# Patient Record
Sex: MALE | Race: WHITE | HISPANIC OR LATINO | Employment: FULL TIME | URBAN - NONMETROPOLITAN AREA
[De-identification: names, ages, dates, MRNs, and addresses within clinical notes are randomized per-mention and may not be internally consistent; named-entity substitution may affect disease eponyms.]

---

## 2023-06-24 RX ORDER — TADALAFIL 20 MG/1
TABLET ORAL
Qty: 27 TABLET | Refills: 4 | OUTPATIENT
Start: 2023-06-24

## 2023-07-07 ENCOUNTER — OFFICE VISIT (OUTPATIENT)
Dept: PRIMARY CARE | Facility: CLINIC | Age: 58
End: 2023-07-07
Payer: COMMERCIAL

## 2023-07-07 VITALS
OXYGEN SATURATION: 96 % | WEIGHT: 163.4 LBS | BODY MASS INDEX: 25.65 KG/M2 | HEIGHT: 67 IN | HEART RATE: 55 BPM | TEMPERATURE: 97.4 F | DIASTOLIC BLOOD PRESSURE: 69 MMHG | RESPIRATION RATE: 14 BRPM | SYSTOLIC BLOOD PRESSURE: 106 MMHG

## 2023-07-07 DIAGNOSIS — I83.93 VARICOSE VEINS OF BOTH LOWER EXTREMITIES, UNSPECIFIED WHETHER COMPLICATED: ICD-10-CM

## 2023-07-07 DIAGNOSIS — N52.9 MALE ERECTILE DISORDER: ICD-10-CM

## 2023-07-07 DIAGNOSIS — D68.51 FACTOR V LEIDEN (MULTI): ICD-10-CM

## 2023-07-07 DIAGNOSIS — T78.40XD ALLERGIC REACTION, SUBSEQUENT ENCOUNTER: ICD-10-CM

## 2023-07-07 DIAGNOSIS — M19.079 ANKLE ARTHRITIS: ICD-10-CM

## 2023-07-07 DIAGNOSIS — E78.5 HYPERLIPIDEMIA, UNSPECIFIED HYPERLIPIDEMIA TYPE: Primary | ICD-10-CM

## 2023-07-07 PROBLEM — Z23 IMMUNIZATION DUE: Status: ACTIVE | Noted: 2023-07-07

## 2023-07-07 PROCEDURE — 1036F TOBACCO NON-USER: CPT | Performed by: FAMILY MEDICINE

## 2023-07-07 PROCEDURE — 99214 OFFICE O/P EST MOD 30 MIN: CPT | Performed by: FAMILY MEDICINE

## 2023-07-07 RX ORDER — SILDENAFIL 50 MG/1
50 TABLET, FILM COATED ORAL DAILY PRN
Qty: 30 TABLET | Refills: 3 | Status: SHIPPED | OUTPATIENT
Start: 2023-07-07 | End: 2023-08-16 | Stop reason: SDUPTHER

## 2023-07-07 RX ORDER — EPINEPHRINE 0.3 MG/.3ML
INJECTION SUBCUTANEOUS
Qty: 2 EACH | Refills: 3 | Status: SHIPPED | OUTPATIENT
Start: 2023-07-07

## 2023-07-07 RX ORDER — TADALAFIL 20 MG/1
20 TABLET ORAL DAILY PRN
Qty: 10 TABLET | Refills: 0 | Status: SHIPPED | OUTPATIENT
Start: 2023-07-07 | End: 2023-08-16 | Stop reason: SDUPTHER

## 2023-07-07 RX ORDER — TADALAFIL 20 MG/1
TABLET ORAL
COMMUNITY
Start: 2016-04-21 | End: 2023-07-07 | Stop reason: SDUPTHER

## 2023-07-07 RX ORDER — EPINEPHRINE 0.3 MG/.3ML
INJECTION SUBCUTANEOUS
COMMUNITY
Start: 2016-08-22 | End: 2023-07-07 | Stop reason: SDUPTHER

## 2023-07-07 NOTE — PROGRESS NOTES
"Subjective   Patient ID: Barrett Ha is a 57 y.o. male who presents for Follow-up (Pt is here for follow up meds/refills- pt has no concerns at this time ).    HPI     High hol is genetic     Non smoker     Exercises fairly regularly     Sober now, hasn't had a drink in years     Feels like he is healthy overall.    Did have a provoked DVT historically, after being extremely ill with mono.    He was temporarily on Coumadin, and is no longer on any anticoagulation.    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  He still maintains activity.    He is requesting medication to assist with erectile dysfunction.  He likes Cialis for longer effects, but would like to have some sildenafil because it leaves his system more quickly and does not cause as many side effects.        Review of Systems    ROS     Objective   /69 (BP Location: Left arm, Patient Position: Sitting, BP Cuff Size: Small adult)   Pulse 55   Temp 36.3 °C (97.4 °F) (Temporal)   Resp 14   Ht 1.702 m (5' 7\")   Wt 74.1 kg (163 lb 6.4 oz)   SpO2 96%   BMI 25.59 kg/m²     Physical Exam  Constitutional:       General: He is not in acute distress.     Appearance: Normal appearance.   Cardiovascular:      Rate and Rhythm: Normal rate and regular rhythm.      Heart sounds: Normal heart sounds.   Pulmonary:      Effort: Pulmonary effort is normal.      Breath sounds: Normal breath sounds. No wheezing, rhonchi or rales.   Abdominal:      Palpations: Abdomen is soft.      Tenderness: There is no abdominal tenderness. There is no guarding or rebound.   Musculoskeletal:      Right lower leg: No edema.      Left lower leg: No edema.      Comments: Varicose veins bilaterally    Neurological:      Mental Status: He is alert.   Psychiatric:         Mood and Affect: Mood normal.             Assessment/Plan   Diagnoses and all orders for this visit:  Hyperlipidemia, " unspecified hyperlipidemia type  Varicose veins of both lower extremities, unspecified whether complicated  Factor V Leiden (CMS/HCC)  Male erectile disorder  -     tadalafil 20 mg tablet; Take 1 tablet (20 mg) by mouth once daily as needed for erectile dysfunction.  -     sildenafil (Viagra) 50 mg tablet; Take 1 tablet (50 mg) by mouth once daily as needed for erectile dysfunction.  Ankle arthritis  Allergic reaction, subsequent encounter  -     EPINEPHrine 0.3 mg/0.3 mL injection syringe; Inject as needed for severe allergy symptoms     Patient is advised to take vitamin D3 over-the-counter between 1005 1000 international units daily.    Patient is advised to go to a pharmacy and receive a shingles vaccine series.    Labs deferred, chronic mild elevation in cholesterol, would use lifestyle to address.  Keep up your good work with nutrition and exercise.     Follow-up in 1 year with Dr. Pryor for complete physical exam.

## 2023-08-16 DIAGNOSIS — N52.9 MALE ERECTILE DISORDER: ICD-10-CM

## 2023-08-16 NOTE — Clinical Note
Refills for Viagra and Cialis received, which 1 does he prefer, generally use 1 or the other, depending on which one works better.

## 2023-08-17 RX ORDER — SILDENAFIL 50 MG/1
50 TABLET, FILM COATED ORAL DAILY PRN
Qty: 90 TABLET | Refills: 3 | Status: SHIPPED | OUTPATIENT
Start: 2023-08-17 | End: 2023-08-30 | Stop reason: SDUPTHER

## 2023-08-17 RX ORDER — TADALAFIL 20 MG/1
20 TABLET ORAL DAILY PRN
Qty: 90 TABLET | Refills: 0 | Status: SHIPPED | OUTPATIENT
Start: 2023-08-17 | End: 2023-09-01 | Stop reason: SDUPTHER

## 2023-08-27 ENCOUNTER — TELEPHONE (OUTPATIENT)
Dept: PRIMARY CARE | Facility: CLINIC | Age: 58
End: 2023-08-27
Payer: COMMERCIAL

## 2023-08-27 NOTE — TELEPHONE ENCOUNTER
I think the problem with [facility] is they do not want to fill both, want to know which one Barrett prefers.  Let me know, I'll resend

## 2023-08-28 ENCOUNTER — TELEPHONE (OUTPATIENT)
Dept: PRIMARY CARE | Facility: CLINIC | Age: 58
End: 2023-08-28
Payer: COMMERCIAL

## 2023-08-28 NOTE — TELEPHONE ENCOUNTER
Mey, do you remember this?  (see attached/previous messages).  Barrett is requesting cialis and viagra, insurance is denying the scripts I sent.  I'm sure it's because they want one or the other

## 2023-08-28 NOTE — TELEPHONE ENCOUNTER
"Spoke with pt. He states [facility] started him on both medications and that's how he took it.  He states he would like the Cialis to [facility] and Viagra [facility]. I advised him with this type of medication it is one or the other. \"He states he was advised by [facility] to take both.\"  "

## 2023-08-28 NOTE — TELEPHONE ENCOUNTER
I sent message yesterday stating he likely needs to pick one to have coverage.  Either the viagra or cialis

## 2023-08-30 ENCOUNTER — TELEPHONE (OUTPATIENT)
Dept: PRIMARY CARE | Facility: CLINIC | Age: 58
End: 2023-08-30
Payer: COMMERCIAL

## 2023-08-30 DIAGNOSIS — N52.9 MALE ERECTILE DISORDER: ICD-10-CM

## 2023-08-30 RX ORDER — SILDENAFIL 100 MG/1
100 TABLET, FILM COATED ORAL DAILY PRN
Qty: 30 TABLET | Refills: 3 | Status: SHIPPED | OUTPATIENT
Start: 2023-08-30 | End: 2023-09-05 | Stop reason: ALTCHOICE

## 2023-08-30 NOTE — TELEPHONE ENCOUNTER
Sent in [facility] to [facility]     100 mg     30 tabs    (Will be either 120 doses, 60 doses, or 30 doses depending on whether he uses 25 mg,   50 mg  or 100mg   as needed

## 2023-09-01 DIAGNOSIS — N52.9 MALE ERECTILE DISORDER: ICD-10-CM

## 2023-09-01 RX ORDER — TADALAFIL 20 MG/1
20 TABLET ORAL DAILY PRN
Qty: 90 TABLET | Refills: 0 | Status: SHIPPED | OUTPATIENT
Start: 2023-09-01

## 2023-09-05 DIAGNOSIS — N52.9 MALE ERECTILE DISORDER: ICD-10-CM

## 2023-09-05 RX ORDER — SILDENAFIL 100 MG/1
100 TABLET, FILM COATED ORAL DAILY PRN
Qty: 30 TABLET | Refills: 3 | OUTPATIENT
Start: 2023-09-05

## 2024-04-03 ENCOUNTER — APPOINTMENT (OUTPATIENT)
Dept: PRIMARY CARE | Facility: CLINIC | Age: 59
End: 2024-04-03
Payer: COMMERCIAL

## 2024-06-01 ENCOUNTER — APPOINTMENT (OUTPATIENT)
Dept: PRIMARY CARE | Facility: CLINIC | Age: 59
End: 2024-06-01
Payer: COMMERCIAL

## 2024-07-01 DIAGNOSIS — N52.9 MALE ERECTILE DISORDER: ICD-10-CM

## 2024-07-01 RX ORDER — TADALAFIL 20 MG/1
20 TABLET ORAL DAILY PRN
Qty: 90 TABLET | Refills: 0 | Status: SHIPPED | OUTPATIENT
Start: 2024-07-01 | End: 2024-07-02 | Stop reason: SDUPTHER

## 2024-07-02 DIAGNOSIS — N52.9 MALE ERECTILE DISORDER: ICD-10-CM

## 2024-07-02 RX ORDER — TADALAFIL 20 MG/1
20 TABLET ORAL DAILY PRN
Qty: 90 TABLET | Refills: 0 | Status: SHIPPED | OUTPATIENT
Start: 2024-07-02

## 2024-07-29 ASSESSMENT — PROMIS GLOBAL HEALTH SCALE
RATE_MENTAL_HEALTH: VERY GOOD
EMOTIONAL_PROBLEMS: SOMETIMES
CARRYOUT_PHYSICAL_ACTIVITIES: COMPLETELY
RATE_SOCIAL_SATISFACTION: VERY GOOD
CARRYOUT_SOCIAL_ACTIVITIES: VERY GOOD
RATE_PHYSICAL_HEALTH: GOOD
RATE_AVERAGE_FATIGUE: MODERATE
RATE_QUALITY_OF_LIFE: GOOD
RATE_GENERAL_HEALTH: GOOD
RATE_AVERAGE_PAIN: 2

## 2024-08-02 ENCOUNTER — APPOINTMENT (OUTPATIENT)
Dept: PRIMARY CARE | Facility: CLINIC | Age: 59
End: 2024-08-02
Payer: COMMERCIAL

## 2024-08-02 VITALS
TEMPERATURE: 98.8 F | HEIGHT: 68 IN | DIASTOLIC BLOOD PRESSURE: 66 MMHG | OXYGEN SATURATION: 94 % | SYSTOLIC BLOOD PRESSURE: 104 MMHG | BODY MASS INDEX: 26.34 KG/M2 | WEIGHT: 173.8 LBS | HEART RATE: 74 BPM

## 2024-08-02 DIAGNOSIS — G62.89 OTHER POLYNEUROPATHY: ICD-10-CM

## 2024-08-02 DIAGNOSIS — Z23 IMMUNIZATION DUE: Primary | ICD-10-CM

## 2024-08-02 DIAGNOSIS — L60.8 ONYCHOMADESIS OF TOENAIL: ICD-10-CM

## 2024-08-02 DIAGNOSIS — Z00.00 HEALTHCARE MAINTENANCE: ICD-10-CM

## 2024-08-02 DIAGNOSIS — Z12.5 SCREENING PSA (PROSTATE SPECIFIC ANTIGEN): ICD-10-CM

## 2024-08-02 DIAGNOSIS — Z12.11 SCREENING FOR MALIGNANT NEOPLASM OF COLON: ICD-10-CM

## 2024-08-02 DIAGNOSIS — E78.5 HYPERLIPIDEMIA, UNSPECIFIED HYPERLIPIDEMIA TYPE: ICD-10-CM

## 2024-08-02 PROCEDURE — 90471 IMMUNIZATION ADMIN: CPT | Performed by: FAMILY MEDICINE

## 2024-08-02 PROCEDURE — 90750 HZV VACC RECOMBINANT IM: CPT | Performed by: FAMILY MEDICINE

## 2024-08-02 PROCEDURE — 99396 PREV VISIT EST AGE 40-64: CPT | Performed by: FAMILY MEDICINE

## 2024-08-02 PROCEDURE — 3008F BODY MASS INDEX DOCD: CPT | Performed by: FAMILY MEDICINE

## 2024-08-02 RX ORDER — TERBINAFINE HYDROCHLORIDE 250 MG/1
250 TABLET ORAL DAILY
Qty: 30 TABLET | Refills: 2 | Status: CANCELLED | OUTPATIENT
Start: 2024-08-02 | End: 2024-10-31

## 2024-08-02 NOTE — PROGRESS NOTES
"Subjective   Patient ID: Barrett Ha is a 58 y.o. male who presents for Annual Exam (Barrett is here for his Annual PE. Pt states he has a few thing to discuss today., ).    HPI   Barrett was seen today for an annual wellness review.  He takes no consistent medications, keeps an EpiPen on hand, tadalafil as needed.  He is due for fasting labs, as well as a PSA.  He has yet to have a colonoscopy  Barrett is due for Shingrix and a Tdap booster.  He is concerned about his circulation, describes paresthesias in both hands and feet.  He has no significant neck or low back pain, no headaches or other focal neurologic symptoms.  He used to drink regularly, has not been in several years.  He would like his left great toenail checked, believes he has a nail fungus.  He has a history of hyperlipidemia, last LDL was 149, HDL 59, triglycerides 91.  Review of Systems  The full, 10+ multi-organ review of systems, is within normal limits with the exception of what is noted above in HPI.  Objective   /66 (BP Location: Right arm, Patient Position: Sitting, BP Cuff Size: Adult)   Pulse 74   Temp 37.1 °C (98.8 °F) (Temporal)   Ht 1.715 m (5' 7.5\")   Wt 78.8 kg (173 lb 12.8 oz)   SpO2 94%   BMI 26.82 kg/m²     Physical Exam  Constitutional/General appearance: alert, oriented, well-appearing, in no distress  Head and face exam is normal  No scleral icterus or conjunctival erythema present  Hearing is grossly normal  Respiratory effort is normal, no dyspnea noted  Cortical function is normal  Mood, affect, are pleasant, appropriate, and interactive.  Insight is normal  Cardiac exam reveals a regular rate and rhythm, no murmurs, rubs or gallops present.   Lungs are clear bilaterally.    No lower extremity edema present.  Bilateral dorsalis pedis pulses along with bilateral posterior tibialis pulses are normal.  Radial pulses also.  Bilateral upper and lower extremity strength, light touch sensation, and deep tendon reflexes are " normal.  Abdominal and ENT exams are within normal limits.  Assessment/Plan     Today your wellness care was reviewed.  Please keep up your vision and dental care.  Focus on lifestyle efforts, including a goal of 30 minutes of exercise 5 days/week.    A healthy diet rich in fruits, vegetables, and lean proteins encouraged.  Healthy fats, such as can be found in nuts, olives, avocados are encouraged (unless allergies prohibit).  Avoid/minimize junk and fast food     Check full labs and urinalysis, and add'l for neuropathy (upper and lower extremity).  Consider nerve conduction/EMG testing.  Lamisil 250 mg daily x 3 months for onychomycosis--do not start until liver function labs are normal  Colonoscopy, referral placed  Reassurance, circulation is good in extremities  Nurse visit shingles #1, and 2-6 months later for booster  Hyperlipidemia history, labs as ordered.    Follow-up in 1 year, sooner if needed.  **Portions of this medical record have been created using voice recognition software and may have minor errors which are inherent in voice recognition systems. It has not been fully edited for typographical or grammatical errors**

## 2024-08-02 NOTE — PATIENT INSTRUCTIONS
Check full labs and urinalysis, and add'l for neuropathy (upper and lower extremity)  Lamisil 250 mg daily x 3 months for onychomycosis--do not start until liver function labs are normal  Colonoscopy, referral placed  Reassurance, circulation is good in extremities  Nurse visit shingles #1, and 2-6 months later for booster

## 2024-08-05 DIAGNOSIS — B35.1 ONYCHOMYCOSIS OF GREAT TOE: Primary | ICD-10-CM

## 2024-08-05 RX ORDER — TERBINAFINE HYDROCHLORIDE 250 MG/1
250 TABLET ORAL DAILY
Qty: 30 TABLET | Refills: 2 | Status: SHIPPED | OUTPATIENT
Start: 2024-08-05

## 2024-08-14 DIAGNOSIS — N52.9 MALE ERECTILE DISORDER: ICD-10-CM

## 2024-08-14 RX ORDER — TADALAFIL 20 MG/1
20-30 TABLET ORAL DAILY PRN
Qty: 90 TABLET | Refills: 0 | Status: SHIPPED | OUTPATIENT
Start: 2024-08-14 | End: 2024-08-19 | Stop reason: SDUPTHER

## 2024-08-19 DIAGNOSIS — N52.9 MALE ERECTILE DISORDER: ICD-10-CM

## 2024-08-19 RX ORDER — TADALAFIL 20 MG/1
20-30 TABLET ORAL DAILY PRN
Qty: 90 TABLET | Refills: 0 | Status: SHIPPED | OUTPATIENT
Start: 2024-08-19

## 2024-08-29 ENCOUNTER — LAB (OUTPATIENT)
Dept: LAB | Facility: LAB | Age: 59
End: 2024-08-29
Payer: COMMERCIAL

## 2024-08-29 ENCOUNTER — APPOINTMENT (OUTPATIENT)
Dept: PRIMARY CARE | Facility: CLINIC | Age: 59
End: 2024-08-29
Payer: COMMERCIAL

## 2024-08-29 ENCOUNTER — TELEPHONE (OUTPATIENT)
Dept: PRIMARY CARE | Facility: CLINIC | Age: 59
End: 2024-08-29

## 2024-08-29 DIAGNOSIS — Z00.00 HEALTHCARE MAINTENANCE: ICD-10-CM

## 2024-08-29 DIAGNOSIS — G62.89 OTHER POLYNEUROPATHY: ICD-10-CM

## 2024-08-29 DIAGNOSIS — Z23 IMMUNIZATION DUE: Primary | ICD-10-CM

## 2024-08-29 DIAGNOSIS — Z12.5 SCREENING PSA (PROSTATE SPECIFIC ANTIGEN): ICD-10-CM

## 2024-08-29 DIAGNOSIS — E78.5 HYPERLIPIDEMIA, UNSPECIFIED HYPERLIPIDEMIA TYPE: ICD-10-CM

## 2024-08-29 DIAGNOSIS — R79.0 ABNORMAL IRON SATURATION: ICD-10-CM

## 2024-08-29 DIAGNOSIS — Z23 IMMUNIZATION DUE: ICD-10-CM

## 2024-08-29 DIAGNOSIS — R79.0 ABNORMAL BLOOD LEVEL OF IRON: ICD-10-CM

## 2024-08-29 LAB
ALBUMIN SERPL BCP-MCNC: 4.4 G/DL (ref 3.4–5)
ALP SERPL-CCNC: 44 U/L (ref 33–120)
ALT SERPL W P-5'-P-CCNC: 22 U/L (ref 10–52)
ANION GAP SERPL CALC-SCNC: 13 MMOL/L (ref 10–20)
APPEARANCE UR: CLEAR
AST SERPL W P-5'-P-CCNC: 18 U/L (ref 9–39)
BASOPHILS # BLD AUTO: 0.03 X10*3/UL (ref 0–0.1)
BASOPHILS NFR BLD AUTO: 0.5 %
BILIRUB SERPL-MCNC: 0.8 MG/DL (ref 0–1.2)
BILIRUB UR STRIP.AUTO-MCNC: NEGATIVE MG/DL
BUN SERPL-MCNC: 11 MG/DL (ref 6–23)
CALCIUM SERPL-MCNC: 9.3 MG/DL (ref 8.6–10.6)
CHLORIDE SERPL-SCNC: 106 MMOL/L (ref 98–107)
CHOLEST SERPL-MCNC: 203 MG/DL (ref 0–199)
CHOLESTEROL/HDL RATIO: 3.7
CO2 SERPL-SCNC: 26 MMOL/L (ref 21–32)
COLOR UR: COLORLESS
CREAT SERPL-MCNC: 0.99 MG/DL (ref 0.5–1.3)
EGFRCR SERPLBLD CKD-EPI 2021: 89 ML/MIN/1.73M*2
EOSINOPHIL # BLD AUTO: 0.16 X10*3/UL (ref 0–0.7)
EOSINOPHIL NFR BLD AUTO: 2.7 %
ERYTHROCYTE [DISTWIDTH] IN BLOOD BY AUTOMATED COUNT: 12.4 % (ref 11.5–14.5)
EST. AVERAGE GLUCOSE BLD GHB EST-MCNC: 103 MG/DL
FOLATE SERPL-MCNC: 11 NG/ML
GLUCOSE SERPL-MCNC: 91 MG/DL (ref 74–99)
GLUCOSE UR STRIP.AUTO-MCNC: NORMAL MG/DL
HBA1C MFR BLD: 5.2 %
HCT VFR BLD AUTO: 46.7 % (ref 41–52)
HDLC SERPL-MCNC: 54.5 MG/DL
HGB BLD-MCNC: 15 G/DL (ref 13.5–17.5)
IMM GRANULOCYTES # BLD AUTO: 0.02 X10*3/UL (ref 0–0.7)
IMM GRANULOCYTES NFR BLD AUTO: 0.3 % (ref 0–0.9)
IRON SATN MFR SERPL: 57 % (ref 25–45)
IRON SERPL-MCNC: 165 UG/DL (ref 35–150)
KETONES UR STRIP.AUTO-MCNC: NEGATIVE MG/DL
LDLC SERPL CALC-MCNC: 124 MG/DL
LEUKOCYTE ESTERASE UR QL STRIP.AUTO: NEGATIVE
LYMPHOCYTES # BLD AUTO: 1.7 X10*3/UL (ref 1.2–4.8)
LYMPHOCYTES NFR BLD AUTO: 29 %
MCH RBC QN AUTO: 29.6 PG (ref 26–34)
MCHC RBC AUTO-ENTMCNC: 32.1 G/DL (ref 32–36)
MCV RBC AUTO: 92 FL (ref 80–100)
MONOCYTES # BLD AUTO: 0.31 X10*3/UL (ref 0.1–1)
MONOCYTES NFR BLD AUTO: 5.3 %
NEUTROPHILS # BLD AUTO: 3.64 X10*3/UL (ref 1.2–7.7)
NEUTROPHILS NFR BLD AUTO: 62.2 %
NITRITE UR QL STRIP.AUTO: NEGATIVE
NON HDL CHOLESTEROL: 149 MG/DL (ref 0–149)
NRBC BLD-RTO: 0 /100 WBCS (ref 0–0)
PH UR STRIP.AUTO: 6.5 [PH]
PLATELET # BLD AUTO: 184 X10*3/UL (ref 150–450)
POTASSIUM SERPL-SCNC: 3.9 MMOL/L (ref 3.5–5.3)
PROT SERPL-MCNC: 6.9 G/DL (ref 6.4–8.2)
PROT SERPL-MCNC: 6.9 G/DL (ref 6.4–8.2)
PROT UR STRIP.AUTO-MCNC: NEGATIVE MG/DL
PROT UR-ACNC: <4 MG/DL (ref 5–25)
PSA SERPL-MCNC: 0.56 NG/ML
RBC # BLD AUTO: 5.06 X10*6/UL (ref 4.5–5.9)
RBC # UR STRIP.AUTO: NEGATIVE /UL
SODIUM SERPL-SCNC: 141 MMOL/L (ref 136–145)
SP GR UR STRIP.AUTO: 1
TIBC SERPL-MCNC: 291 UG/DL (ref 240–445)
TRIGL SERPL-MCNC: 121 MG/DL (ref 0–149)
TSH SERPL-ACNC: 0.86 MIU/L (ref 0.44–3.98)
UIBC SERPL-MCNC: 126 UG/DL (ref 110–370)
UROBILINOGEN UR STRIP.AUTO-MCNC: NORMAL MG/DL
VIT B12 SERPL-MCNC: 374 PG/ML (ref 211–911)
VLDL: 24 MG/DL (ref 0–40)
WBC # BLD AUTO: 5.9 X10*3/UL (ref 4.4–11.3)

## 2024-08-29 PROCEDURE — 84153 ASSAY OF PSA TOTAL: CPT

## 2024-08-29 PROCEDURE — 82728 ASSAY OF FERRITIN: CPT

## 2024-08-29 PROCEDURE — 80053 COMPREHEN METABOLIC PANEL: CPT

## 2024-08-29 PROCEDURE — 85025 COMPLETE CBC W/AUTO DIFF WBC: CPT

## 2024-08-29 PROCEDURE — 82746 ASSAY OF FOLIC ACID SERUM: CPT

## 2024-08-29 PROCEDURE — 83036 HEMOGLOBIN GLYCOSYLATED A1C: CPT

## 2024-08-29 PROCEDURE — 84166 PROTEIN E-PHORESIS/URINE/CSF: CPT

## 2024-08-29 PROCEDURE — 84155 ASSAY OF PROTEIN SERUM: CPT

## 2024-08-29 PROCEDURE — 81256 HFE GENE: CPT

## 2024-08-29 PROCEDURE — 84165 PROTEIN E-PHORESIS SERUM: CPT

## 2024-08-29 PROCEDURE — 80061 LIPID PANEL: CPT

## 2024-08-29 PROCEDURE — 81003 URINALYSIS AUTO W/O SCOPE: CPT

## 2024-08-29 PROCEDURE — G0452 MOLECULAR PATHOLOGY INTERPR: HCPCS | Performed by: FAMILY MEDICINE

## 2024-08-29 PROCEDURE — 90750 HZV VACC RECOMBINANT IM: CPT | Performed by: FAMILY MEDICINE

## 2024-08-29 PROCEDURE — 83540 ASSAY OF IRON: CPT

## 2024-08-29 PROCEDURE — 90471 IMMUNIZATION ADMIN: CPT | Performed by: FAMILY MEDICINE

## 2024-08-29 PROCEDURE — 36415 COLL VENOUS BLD VENIPUNCTURE: CPT

## 2024-08-29 PROCEDURE — 84156 ASSAY OF PROTEIN URINE: CPT

## 2024-08-29 PROCEDURE — 84443 ASSAY THYROID STIM HORMONE: CPT

## 2024-08-29 PROCEDURE — 82607 VITAMIN B-12: CPT

## 2024-08-29 PROCEDURE — 83550 IRON BINDING TEST: CPT

## 2024-08-31 LAB
ALBUMIN MFR UR ELPH: 35.1 %
ALBUMIN: 4.5 G/DL (ref 3.4–5)
ALPHA 1 GLOBULIN: 0.3 G/DL (ref 0.2–0.6)
ALPHA 2 GLOBULIN: 0.5 G/DL (ref 0.4–1.1)
ALPHA1 GLOB MFR UR ELPH: 13.6 %
ALPHA2 GLOB MFR UR ELPH: 16.9 %
B-GLOBULIN MFR UR ELPH: 14.5 %
BETA GLOBULIN: 0.8 G/DL (ref 0.5–1.2)
GAMMA GLOB MFR UR ELPH: 19.9 %
GAMMA GLOBULIN: 0.9 G/DL (ref 0.5–1.4)
PATH REVIEW-SERUM PROTEIN ELECTROPHORESIS: NORMAL
PATH REVIEW-URINE PROTEIN ELECTROPHORESIS: NORMAL
PROTEIN ELECTROPHORESIS COMMENT: NORMAL
URINE ELECTROPHORESIS COMMENT: NORMAL

## 2024-09-04 LAB — FERRITIN SERPL-MCNC: 182 NG/ML (ref 20–300)

## 2024-09-09 ENCOUNTER — TELEPHONE (OUTPATIENT)
Dept: PRIMARY CARE | Facility: CLINIC | Age: 59
End: 2024-09-09
Payer: COMMERCIAL

## 2024-09-09 DIAGNOSIS — R79.0 ABNORMAL IRON SATURATION: ICD-10-CM

## 2024-09-09 DIAGNOSIS — Z83.49 FAMILY HISTORY OF HEMOCHROMATOSIS: Primary | ICD-10-CM

## 2024-09-09 LAB
ELECTRONICALLY SIGNED BY: NORMAL
HFE GENE MUT TESTED BLD/T: NORMAL
HFE P.C282Y BLD/T QL: NORMAL
HFE P.H63D BLD/T QL: NORMAL

## 2024-09-09 NOTE — TELEPHONE ENCOUNTER
Please let Barrett know that the hemochromatosis gene test could not be added to the previous lab work after all, I placed an order, so he can come to the lab when able, does not have to fast.

## 2024-09-17 ENCOUNTER — TELEPHONE (OUTPATIENT)
Dept: PRIMARY CARE | Facility: CLINIC | Age: 59
End: 2024-09-17
Payer: COMMERCIAL

## 2024-09-17 DIAGNOSIS — N52.9 MALE ERECTILE DISORDER: ICD-10-CM

## 2024-09-17 RX ORDER — TADALAFIL 20 MG/1
TABLET ORAL
Qty: 45 TABLET | Refills: 3 | Status: SHIPPED | OUTPATIENT
Start: 2024-09-17

## 2024-09-17 NOTE — TELEPHONE ENCOUNTER
I sent a prescription for Cialis to your [facility], they will run it through [facility], and not use your insurance.  This is definitely the least expensive way for you to get it.  I sent a prescription for 45 tablets plus refills.

## 2024-10-26 DIAGNOSIS — B35.1 ONYCHOMYCOSIS OF GREAT TOE: ICD-10-CM

## 2024-10-26 RX ORDER — TERBINAFINE HYDROCHLORIDE 250 MG/1
250 TABLET ORAL DAILY
Qty: 30 TABLET | Refills: 2 | Status: SHIPPED | OUTPATIENT
Start: 2024-10-26

## 2024-11-05 ENCOUNTER — APPOINTMENT (OUTPATIENT)
Dept: PRIMARY CARE | Facility: CLINIC | Age: 59
End: 2024-11-05
Payer: COMMERCIAL

## 2024-11-05 DIAGNOSIS — Z23 IMMUNIZATION DUE: ICD-10-CM

## 2024-11-05 PROCEDURE — 90750 HZV VACC RECOMBINANT IM: CPT | Performed by: FAMILY MEDICINE

## 2024-11-05 PROCEDURE — 90471 IMMUNIZATION ADMIN: CPT | Performed by: FAMILY MEDICINE

## 2024-11-11 DIAGNOSIS — B35.1 ONYCHOMYCOSIS OF GREAT TOE: ICD-10-CM

## 2024-11-11 RX ORDER — TERBINAFINE HYDROCHLORIDE 250 MG/1
250 TABLET ORAL DAILY
Qty: 30 TABLET | Refills: 2 | Status: SHIPPED | OUTPATIENT
Start: 2024-11-11

## 2024-11-26 ENCOUNTER — APPOINTMENT (OUTPATIENT)
Dept: HEMATOLOGY/ONCOLOGY | Facility: CLINIC | Age: 59
End: 2024-11-26
Payer: COMMERCIAL

## 2024-12-20 ENCOUNTER — OFFICE VISIT (OUTPATIENT)
Dept: HEMATOLOGY/ONCOLOGY | Facility: CLINIC | Age: 59
End: 2024-12-20
Payer: COMMERCIAL

## 2024-12-20 ENCOUNTER — LAB (OUTPATIENT)
Dept: LAB | Facility: CLINIC | Age: 59
End: 2024-12-20
Payer: COMMERCIAL

## 2024-12-20 ENCOUNTER — APPOINTMENT (OUTPATIENT)
Dept: HEMATOLOGY/ONCOLOGY | Facility: CLINIC | Age: 59
End: 2024-12-20
Payer: COMMERCIAL

## 2024-12-20 VITALS
RESPIRATION RATE: 16 BRPM | SYSTOLIC BLOOD PRESSURE: 109 MMHG | WEIGHT: 173.17 LBS | DIASTOLIC BLOOD PRESSURE: 73 MMHG | OXYGEN SATURATION: 95 % | TEMPERATURE: 97.3 F | HEART RATE: 67 BPM | HEIGHT: 66 IN | BODY MASS INDEX: 27.83 KG/M2

## 2024-12-20 DIAGNOSIS — Z83.49 FAMILY HISTORY OF HEMOCHROMATOSIS: ICD-10-CM

## 2024-12-20 DIAGNOSIS — R79.0 ABNORMAL IRON SATURATION: ICD-10-CM

## 2024-12-20 PROCEDURE — 3008F BODY MASS INDEX DOCD: CPT | Performed by: INTERNAL MEDICINE

## 2024-12-20 PROCEDURE — 81256 HFE GENE: CPT

## 2024-12-20 PROCEDURE — 99204 OFFICE O/P NEW MOD 45 MIN: CPT | Performed by: INTERNAL MEDICINE

## 2024-12-20 PROCEDURE — 99214 OFFICE O/P EST MOD 30 MIN: CPT | Performed by: INTERNAL MEDICINE

## 2024-12-20 PROCEDURE — 36415 COLL VENOUS BLD VENIPUNCTURE: CPT

## 2024-12-20 PROCEDURE — G2211 COMPLEX E/M VISIT ADD ON: HCPCS | Performed by: INTERNAL MEDICINE

## 2024-12-20 ASSESSMENT — PAIN SCALES - GENERAL: PAINLEVEL_OUTOF10: 0-NO PAIN

## 2024-12-20 NOTE — PROGRESS NOTES
Patient for labs today.  FUV in three months with labs prior.  Patient independently ambulatory off unit in NAD and without complaints.  Gait steady.  Call back instructions reviewed.  Patient verbalized understanding.  Patient text his sister fo see if she knows her gene mutation.  Instructed patient he can either call our office or sent to Dr. Darnell via [facility].  Patient verbalized understanding and confirms he does use [facility].

## 2024-12-20 NOTE — PROGRESS NOTES
Patient ID: Barrett Ha is a 59 y.o. male.  Referring Physician: Favian Pryor MD  [address] [facility]  [place] [address]  Primary Care Provider: Favian Pryor MD      ORDERS & PATIENT INSTRUCTIONS:    Please do hemochromatosis test  S65C (patient already had C2 82Y and H63D mutation tested)  This test is a send out test.    Patient to see if we can find out sisters gene mutation who has hemochromatosis and let me know.      Patient does not require any active treatment but he could consider donating blood once every 2 to 3 months    I expect his iron saturation to go back to normal sometime in 6-month  CBC, iron group and ferritin in 6-month (fasting)          CONSULTING PHYSICIAN:  Dr. Pryor    REASON FOR CONSULTATION:    Elevated iron    HISTORY OF PRESENT ILLNESS:    Patient is a 59-year-old white man with past medical history of factor V Leiden mutation anxiety and history of alcohol use in the past who has quit 3 years ago who has 2 half sisters with hemochromatosis who had a routine physical and was found to have elevated iron saturation of 57% with ferritin of 182.  So he had hemochromatosis profile done and he was negative for C282Y mutation as well as H63D mutation of hemochromatosis.  Although patient claims that he has several signs of possible hemochromatosis such as decreased libido and joint pains so presents here today for further evaluation.  PAST MEDICAL HISTORY:  History of factor V Leiden mutation, further details unclear, he had a varicose vein and superficial thrombophlebitis in 2008 and at that time he was worked up at [facility] by me although further details unclear and my records are not available at this time.  He was given short-term Coumadin and later was discontinued.  Patient drank 4-6 beers (IPA with higher percentages) daily from age 18 through 56 and quit 3 years ago.  Past Medical History:   Diagnosis Date    Contact with and (suspected)  "exposure to other bacterial communicable diseases 04/16/2014    Strep throat exposure    Encounter for screening for malignant neoplasm of prostate 11/02/2020    Screening PSA (prostate specific antigen)    Other conditions influencing health status 01/22/2015    Paronychia    Other specified health status     No pertinent past surgical history    Personal history of other diseases of the musculoskeletal system and connective tissue 04/16/2014    History of muscle pain    Personal history of other diseases of the respiratory system 02/14/2017    History of sinusitis    Personal history of other diseases of the respiratory system 03/22/2018    History of acute sinusitis        Past Surgical History:   Procedure Laterality Date    MOUTH SURGERY  11/06/2014    Oral Surgery Tooth Extraction                  REVIEW OF SYSTEMS:    Decreased libido, morning stiffness occasional joint swelling, ringing in the ear rest of the 10 review of system is negative and as per HPI    PHYSICAL EXAM:  Objective   BSA: 1.92 meters squared,   /73   Pulse 67   Temp 36.3 °C (97.3 °F)   Resp 16   Ht (S) 1.686 m (5' 6.38\")   Wt 78.5 kg (173 lb 2.7 oz)   SpO2 95%   BMI 27.63 kg/m²   Gen:  Conscious,  no acute distress,   HEENT: Normocephalic, no icterus. . No nasal discharge,  Oral mucosa moist  Chest: Bilateral symmetrical, Bilateral AE        CVS: S1S2.   Abdomen: Soft, no guarding or rigidity BS+   Extremities: No C/C   Skin: No petechiae        CURRENT MEDICATION:  Current Outpatient Medications   Medication Sig Dispense Refill    EPINEPHrine 0.3 mg/0.3 mL injection syringe Inject as needed for severe allergy symptoms 2 each 3    tadalafil 20 mg tablet Take 1 to 1.5 tablets as directed.  Requests good Rx discount please 45 tablet 3    terbinafine (LamISIL) 250 mg tablet Take 1 tablet (250 mg) by mouth once daily. 30 tablet 2     No current facility-administered medications for this visit.           LABS:    CBC:  Recent Labs " "    08/29/24  0847 09/09/22  0805 07/01/20  1458   WBC 5.9 4.9 6.0   HGB 15.0 15.4 15.2   HCT 46.7 45.6 44.1    185 222   MCV 92 90 90       CMP:  Recent Labs     08/29/24  0847 09/09/22  0805 07/01/20  1458    140 138   K 3.9 4.4 3.9    104 103   CO2 26 28 27   ANIONGAP 13 12 12   BUN 11 12 20   CREATININE 0.99 0.88 0.96   EGFR 89  --   --      Recent Labs     08/29/24  0847 09/09/22  0805   ALBUMIN 4.4 4.7   ALKPHOS 44 68   ALT 22 17   AST 18 15   BILITOT 0.8 0.9       HEME/ENDO:  Recent Labs     08/29/24  0847 09/09/22  0805   FERRITIN 182  --    IRONSAT 57*  --    TSH 0.86 0.95   [id number] 374  --    FOLATE 11.0  --         MICRO: No results for input(s): \"ESR\", \"CRP\", \"PROCAL\" in the last 48883 hours.  No results found for the last 90 days.        TUMOR MARKERS:  PSA (ng/mL)   Date Value   11/03/2020 0.36                IMAGING:         OUTSIDE IMAGING SCAN  Ordered by an unspecified provider.               SOCIAL HISTORY:    reports no history of alcohol use.   reports no history of drug use.,   Tobacco Use: Low Risk  (8/2/2024)    Patient History     Smoking Tobacco Use: Never     Smokeless Tobacco Use: Never     Passive Exposure: Not on file   Patient denies smoking, he drank 4-6 IPA beers with higher percentages daily from [age] through 56, quit 3 years ago.    FAMILY HISTORY:  Family History   Family history unknown: Yes   2 half sisters who has same mother but different father has hemochromatosis, further details unclear at this time..  ALLERGY:   Patient has no known allergies.          ASSESSMENT & PLAN:    Patient is a 59-year-old white man with mildly elevated iron saturation and minimally/borderline ferritin whose hemochromatosis profile was negative for C282Y mutation as well as H63D mutation of hemochromatosis.  At this time I explained to him that most likely reason for his mildly elevated iron saturation could very well be related to liver dysfunction may be related to past " alcohol use rather than hemochromatosis although possibility of hemochromatosis related to  S65C genotype mutation cannot be ruled out.  At the same time I do not believe any of his sign or symptoms could be explained by mildly elevated iron saturation and borderline ferritin but he could consider giving blood donation through blood bank especially now blood bank is excepting blood donations from patient with elevated iron.  I have also advised him to check with his sister to see which hemochromatosis gene they are caring.  Patient will now return for follow-up in 6-month in the meantime he will have 2-3 blood donations and we will recheck his iron saturation and ferritin.  At the same time I have advised him to follow-up with primary care physician for other medical needs  Medication reviewed in e-chart  labs reviewed and interpreted independently, X rays independently reviewed  Notes from other physicians involved in care were reviewed      Thank you very much for allowing me to participate in care of this patient, should you have any further question please do not hesitate to contact me.    Charting was completed using voice recognition technology and may include unintended errors.    AL DUVALL MD, JOSE.    Amber Blanca Master Clinician in Hematology and Oncology  Medical Director, [facility] at [facility].  [place] office  Phone [phone]  Fax      [phone]    [facility] /[place].  Phone [phone]  Fax     [phone]

## 2025-01-09 ENCOUNTER — TELEPHONE (OUTPATIENT)
Dept: HEMATOLOGY/ONCOLOGY | Facility: CLINIC | Age: 60
End: 2025-01-09
Payer: COMMERCIAL

## 2025-01-09 NOTE — TELEPHONE ENCOUNTER
Call placed to  Lab [phone] Pauline.  Per Pauline the only way that S65 can be ordered is through a send out panel done at [facility] test code [id number] - EDTA 1 ml room temperature.  It is ordered as a miscellaneous genetics test.

## 2025-01-09 NOTE — TELEPHONE ENCOUNTER
Patient states he was supposed to have hemochromatosis test  S65C (patient already had C2 82Y and H63D mutation tested).     All he is seeing in [facility] is is the results of the two he had and not the S65C.    Please advise.  You can call or message him through [facility].

## 2025-01-09 NOTE — TELEPHONE ENCOUNTER
Call returned to patient.  Patient notified of situation with lab and how it will need re-ordered and re-drawn.  Will discuss with provider and follow-up with patient.

## 2025-02-07 NOTE — TELEPHONE ENCOUNTER
Call returned to patient.  No answer.  Message left on identifiable voicemail with call back instructions including reminder that S65C can only be ordered as part of a send out panel which includes all 3 mutations.  Recommended patient call back and let us know if he is interested in having this panel drawn.  Call back instructions left.

## 2025-02-10 ENCOUNTER — TELEPHONE (OUTPATIENT)
Facility: CLINIC | Age: 60
End: 2025-02-10
Payer: COMMERCIAL

## 2025-03-24 ENCOUNTER — APPOINTMENT (OUTPATIENT)
Dept: PRIMARY CARE | Facility: CLINIC | Age: 60
End: 2025-03-24
Payer: COMMERCIAL

## 2025-03-24 VITALS
TEMPERATURE: 97.9 F | WEIGHT: 176.3 LBS | BODY MASS INDEX: 27.67 KG/M2 | OXYGEN SATURATION: 95 % | DIASTOLIC BLOOD PRESSURE: 61 MMHG | HEART RATE: 61 BPM | HEIGHT: 67 IN | SYSTOLIC BLOOD PRESSURE: 105 MMHG | RESPIRATION RATE: 18 BRPM

## 2025-03-24 DIAGNOSIS — T78.40XD ALLERGIC REACTION, SUBSEQUENT ENCOUNTER: ICD-10-CM

## 2025-03-24 DIAGNOSIS — D68.51 FACTOR V LEIDEN (MULTI): Primary | ICD-10-CM

## 2025-03-24 DIAGNOSIS — E78.5 HYPERLIPIDEMIA, UNSPECIFIED HYPERLIPIDEMIA TYPE: ICD-10-CM

## 2025-03-24 DIAGNOSIS — B35.1 ONYCHOMYCOSIS: ICD-10-CM

## 2025-03-24 DIAGNOSIS — I83.893 VARICOSE VEINS OF BILATERAL LOWER EXTREMITIES WITH OTHER COMPLICATIONS: ICD-10-CM

## 2025-03-24 DIAGNOSIS — Z82.49 FAMILY HISTORY OF CARDIAC ARREST: ICD-10-CM

## 2025-03-24 DIAGNOSIS — Z82.3 FAMILY HISTORY OF CVA: ICD-10-CM

## 2025-03-24 DIAGNOSIS — N52.9 MALE ERECTILE DISORDER: ICD-10-CM

## 2025-03-24 PROCEDURE — 1036F TOBACCO NON-USER: CPT | Performed by: STUDENT IN AN ORGANIZED HEALTH CARE EDUCATION/TRAINING PROGRAM

## 2025-03-24 PROCEDURE — 99214 OFFICE O/P EST MOD 30 MIN: CPT | Performed by: STUDENT IN AN ORGANIZED HEALTH CARE EDUCATION/TRAINING PROGRAM

## 2025-03-24 PROCEDURE — 3008F BODY MASS INDEX DOCD: CPT | Performed by: STUDENT IN AN ORGANIZED HEALTH CARE EDUCATION/TRAINING PROGRAM

## 2025-03-24 RX ORDER — EPINEPHRINE 0.3 MG/.3ML
INJECTION SUBCUTANEOUS
Qty: 2 EACH | Refills: 3 | Status: SHIPPED | OUTPATIENT
Start: 2025-03-24

## 2025-03-24 RX ORDER — CICLOPIROX OLAMINE 7.7 MG/G
CREAM TOPICAL 2 TIMES DAILY
Qty: 90 G | Refills: 2 | Status: SHIPPED | OUTPATIENT
Start: 2025-03-24

## 2025-03-24 RX ORDER — TADALAFIL 20 MG/1
TABLET ORAL
Qty: 45 TABLET | Refills: 3 | Status: SHIPPED | OUTPATIENT
Start: 2025-03-24

## 2025-03-24 NOTE — PROGRESS NOTES
"FAMILY MEDICINE  OFFICE VISIT   Barrett Ha  [id number]  1965    PCP: Iris Bar DO     Chief Complaint:   Chief Complaint   Patient presents with    Liver enzyme testing     Pt presents to discuss liver enzyme testing-  Pt states he wants to discuss medications- superficial blood clots     SUBJECTIVE     Barrett Ha is a 59 y.o. English-speaking male with pertinent PMHx of Factor V Leiden, who presents to the clinic with complaints of toe fungus.    Patient is new to me as PCP, as Dr. Pryor left the practice in January.     Toes   - Wanted refill of Lamisil, no labs done.   - January 2023 or 2024.   - Didn't start with the oral.   - Took the Lamisil for awhile and didn't get any better.     Factor V Leiden   Superficial Phlebitis   Hardening in Ankle   - Took Warfarin for a period of time.   - Hx of sup phlebitis  - No current AC   - Follows with Dr. Darnell.   - He has not had any issues with clots after  - Does have varicose veins   - Veins feel hard to him  - Feel uncomfortable and painful at times.   - Not followign with Vascular     Mom side of the family has everything bad.   2 sisters have hemochormatosis   Mom: dementia  Pat gpa: MI, heavy smoke   Pat uncle: stroke 60s      HPI      The following portions of the patient's chart were reviewed in this encounter and updated as appropriate:  Tobacco  Allergies  Meds  Problems  Med Hx  Surg Hx  Fam Hx         Home Medication List:  Current Outpatient Medications   Medication Instructions    EPINEPHrine 0.3 mg/0.3 mL injection syringe Inject as needed for severe allergy symptoms    tadalafil 20 mg tablet Take 1 to 1.5 tablets as directed.  Requests good Rx discount please    terbinafine (LAMISIL) 250 mg, oral, Daily         OBJECTIVE   /61 (BP Location: Left arm, Patient Position: Sitting, BP Cuff Size: Adult)   Pulse 61   Temp 36.6 °C (97.9 °F) (Temporal)   Resp 18   Ht 1.702 m (5' 7\")   Wt 80 kg (176 lb 4.8 oz)   SpO2 95%   " BMI 27.61 kg/m²   Vital signs and pulse oximetry reviewed.     Physical Exam  Vitals and nursing note reviewed.   Constitutional:       Appearance: Normal appearance.   HENT:      Head: Normocephalic and atraumatic.      Right Ear: External ear normal.      Left Ear: External ear normal.      Nose: Nose normal. No congestion or rhinorrhea.   Eyes:      General: No scleral icterus.     Conjunctiva/sclera: Conjunctivae normal.   Cardiovascular:      Rate and Rhythm: Normal rate and regular rhythm.      Heart sounds: No murmur heard.  Pulmonary:      Effort: Pulmonary effort is normal. No respiratory distress.      Breath sounds: Normal breath sounds. No wheezing, rhonchi or rales.   Musculoskeletal:      Right lower leg: No edema.      Left lower leg: No edema.      Comments: Lateral varicose veins, some of them are tender to the touch, particularly of his distal right leg.  He does not have any overwhelming signs of a acute clot, as there is no erythema or swelling in the legs.  Other than the varicose veins that are relatively firm, his leg exam is relatively normal.   Skin:     General: Skin is warm.      Coloration: Skin is not jaundiced.   Neurological:      Mental Status: He is alert. Mental status is at baseline.   Psychiatric:         Mood and Affect: Mood normal.         Behavior: Behavior normal.         ASSESSMENT & PLAN     Problem List Items Addressed This Visit       Factor V Leiden (Multi) - Primary    Current Assessment & Plan   1. Factor V Leiden (Multi) (Primary)  Patient has a history of factor V Leiden, he currently follows [facility].  He has been complaining of some pain in his varicose veins and feels like his veins are hard to the touch.  He has not had erythema or swelling in the leg, but notices just over the veins themselves it is uncomfortable.  - We will obtain blood work, as he has not had any recently.  - We will also obtain ultrasound of the leg.  - He is not currently on  anticoagulation, so if he does have any abnormal findings or concerns for blood clots he would be to start anticoagulation.  He was on warfarin in the past.  - Will obtain a vascular surgery referral.  - CBC and Auto Differential; Future  - Comprehensive Metabolic Panel; Future  - Hemoglobin A1C; Future  - Lipid Panel; Future  - TSH with reflex to Free T4 if abnormal; Future  - Follow Up In [facility] - PCP - Health Maintenance; Future  - Referral to Vascular Surgery; Future  - Vascular US lower extremity venous duplex bilateral; Future         Relevant Orders    CBC and Auto Differential    Comprehensive Metabolic Panel    Hemoglobin A1C    Lipid Panel    TSH with reflex to Free T4 if abnormal    Follow Up In [facility] - PCP - Health Maintenance    Referral to Vascular Surgery    Vascular US lower extremity venous duplex bilateral (Completed)    Family history of cardiac arrest    Relevant Orders    CT cardiac scoring wo IV contrast    Family history of CVA    Relevant Orders    CT cardiac scoring wo IV contrast    Hyperlipidemia    Current Assessment & Plan   2. Hyperlipidemia, unspecified hyperlipidemia type  Patient has a family history of stroke as well as MI.  He personally has not had a stroke or MI.  However, he does have a history of factor V Leiden, which does complicate things.  - He is due for labs today.  He will need strict lipid control, given his clotting history.  - Will obtain a CT cardiac score at this time to determine whether or not he would be a good candidate for statin medication.  - CBC and Auto Differential; Future  - Comprehensive Metabolic Panel; Future  - Hemoglobin A1C; Future  - Lipid Panel; Future  - TSH with reflex to Free T4 if abnormal; Future  - CT cardiac scoring wo IV contrast; Future         Relevant Orders    CBC and Auto Differential    Comprehensive Metabolic Panel    Hemoglobin A1C    Lipid Panel    TSH with reflex to Free T4 if abnormal    CT cardiac  scoring wo IV contrast    Male erectile disorder    Relevant Medications    tadalafil 20 mg tablet    Onychomycosis    Current Assessment & Plan   Patient is inquiring whether or not he can go back on oral medication for his onychomycosis.  He does have some new growth of good nail in the nailbed.  I feel at this time he would be a better candidate for cream, rather than continuing the oral medication at this time.  - If he does not have good response and continued good response of his nail, we can transition him back to oral treatment, pending his LFTs.  - ciclopirox (Loprox) 0.77 % cream; Apply topically 2 times a day. Use for 4 weeks.  Dispense: 90 g; Refill: 2         Relevant Medications    ciclopirox (Loprox) 0.77 % cream    Varicose veins of legs    Current Assessment & Plan   7. Varicose veins of bilateral lower extremities with other complications  Patient has a history of factor V Leiden, he currently follows with [facility].  He has been complaining of some pain in his varicose veins and feels like his veins are hard to the touch.  He has not had erythema or swelling in the leg, but notices just over the veins themselves it is uncomfortable.  - CBC and Auto Differential; Future  - Comprehensive Metabolic Panel; Future  - Hemoglobin A1C; Future  - Lipid Panel; Future  - TSH with reflex to Free T4 if abnormal; Future  - Follow Up In [facility] - PCP - Health Maintenance; Future  - Referral to Vascular Surgery; Future  - CBC and Auto Differential  - Comprehensive Metabolic Panel  - Hemoglobin A1C  - Lipid Panel  - TSH with reflex to Free T4 if abnormal  - Vascular US lower extremity venous duplex bilateral; Future         Relevant Orders    CBC and Auto Differential    Comprehensive Metabolic Panel    Hemoglobin A1C    Lipid Panel    TSH with reflex to Free T4 if abnormal    Follow Up In [facility] - PCP - Health Maintenance    Referral to Vascular Surgery    Vascular US lower  extremity venous duplex bilateral (Completed)     Other Visit Diagnoses         Allergic reaction, subsequent encounter        Relevant Medications    EPINEPHrine 0.3 mg/0.3 mL injection syringe            Level 4     Follow-Up Recommendations: PRN, based on imaging    Please excuse any typos or grammatical errors, part of this note was constructed with Dragon dictation software.    Iris Bar DO, Ramana  [facility]   Office: [phone]  3/24/2025 11:08 AM

## 2025-03-24 NOTE — PATIENT INSTRUCTIONS
Call Josué Rowland's direct phone # [phone] to schedule referral and imaging.     To schedule an appointment to get your labwork done at the [facility], please go to: [email or web address]

## 2025-04-15 ENCOUNTER — TELEPHONE (OUTPATIENT)
Dept: PRIMARY CARE | Facility: CLINIC | Age: 60
End: 2025-04-15
Payer: COMMERCIAL

## 2025-04-15 ENCOUNTER — HOSPITAL ENCOUNTER (OUTPATIENT)
Dept: VASCULAR MEDICINE | Facility: CLINIC | Age: 60
Discharge: HOME | End: 2025-04-15
Payer: COMMERCIAL

## 2025-04-15 DIAGNOSIS — I82.461 ACUTE DEEP VEIN THROMBOSIS (DVT) OF CALF MUSCLE VEIN OF RIGHT LOWER EXTREMITY: Primary | ICD-10-CM

## 2025-04-15 DIAGNOSIS — D68.51 FACTOR V LEIDEN (MULTI): ICD-10-CM

## 2025-04-15 DIAGNOSIS — I83.893 VARICOSE VEINS OF BILATERAL LOWER EXTREMITIES WITH OTHER COMPLICATIONS: ICD-10-CM

## 2025-04-15 PROCEDURE — 93970 EXTREMITY STUDY: CPT | Performed by: SURGERY

## 2025-04-15 PROCEDURE — 93970 EXTREMITY STUDY: CPT

## 2025-04-15 NOTE — TELEPHONE ENCOUNTER
Patient called and stated that [facility] is calling him about his eliquis and [facility] [place] really needs this taken care of soon as possible

## 2025-04-15 NOTE — TELEPHONE ENCOUNTER
Medication sent this afternoon to [facility]. [facility] approved PA. PA approved for both the starter pack and continuation therapy. Will send continuation on Friday when I see patient in the office.     Iris Bar DO, MSEd  [facility]  Office: [phone]  4/15/2025 4:49 PM

## 2025-04-15 NOTE — PROGRESS NOTES
1. Acute deep vein thrombosis (DVT) of calf muscle vein of right lower extremity (Primary)  - apixaban (Eliquis) 5 mg (74 tabs) tablet; Take 2 tablets (10 mg) by mouth 2 times a day for 7 days, then take 1 tablet (5 mg) by mouth 2 times a day.  Dispense: 74 tablet; Refill: 0      Iris DO Bar MSEd  [facility]  Office: [phone]  4/15/2025 3:21 PM

## 2025-04-15 NOTE — TELEPHONE ENCOUNTER
Called and Epic chat from vascular US tech with right soleal thrombus that is extending from a thrombused varicose vein. Patient typically follows with Heme Dr. Darnell. Was on Warfarin in the past, but stopped. Will need to restart anticoagulation. Tried to do Xarelto vs Eliquis, and needs PA. Talked with noemi Mays.     Iris Bar DO, MSEd  [facility]  Office: [phone]  4/15/2025 11:21 AM

## 2025-04-16 PROBLEM — M19.079 ANKLE ARTHRITIS: Status: RESOLVED | Noted: 2023-07-07 | Resolved: 2025-04-16

## 2025-04-16 PROBLEM — Z23 IMMUNIZATION DUE: Status: RESOLVED | Noted: 2023-07-07 | Resolved: 2025-04-16

## 2025-04-16 PROBLEM — B35.1 ONYCHOMYCOSIS: Status: ACTIVE | Noted: 2025-04-16

## 2025-04-16 PROBLEM — Z82.3 FAMILY HISTORY OF CVA: Status: ACTIVE | Noted: 2025-04-16

## 2025-04-16 PROBLEM — Z82.49 FAMILY HISTORY OF CARDIAC ARREST: Status: ACTIVE | Noted: 2025-04-16

## 2025-04-16 NOTE — ASSESSMENT & PLAN NOTE
2. Hyperlipidemia, unspecified hyperlipidemia type  Patient has a family history of stroke as well as MI.  He personally has not had a stroke or MI.  However, he does have a history of factor V Leiden, which does complicate things.  - He is due for labs today.  He will need strict lipid control, given his clotting history.  - Will obtain a CT cardiac score at this time to determine whether or not he would be a good candidate for statin medication.  - CBC and Auto Differential; Future  - Comprehensive Metabolic Panel; Future  - Hemoglobin A1C; Future  - Lipid Panel; Future  - TSH with reflex to Free T4 if abnormal; Future  - CT cardiac scoring wo IV contrast; Future

## 2025-04-16 NOTE — ASSESSMENT & PLAN NOTE
7. Varicose veins of bilateral lower extremities with other complications  Patient has a history of factor V Leiden, he currently follows with [facility].  He has been complaining of some pain in his varicose veins and feels like his veins are hard to the touch.  He has not had erythema or swelling in the leg, but notices just over the veins themselves it is uncomfortable.  - CBC and Auto Differential; Future  - Comprehensive Metabolic Panel; Future  - Hemoglobin A1C; Future  - Lipid Panel; Future  - TSH with reflex to Free T4 if abnormal; Future  - Follow Up In [facility] - PCP - Health Maintenance; Future  - Referral to Vascular Surgery; Future  - CBC and Auto Differential  - Comprehensive Metabolic Panel  - Hemoglobin A1C  - Lipid Panel  - TSH with reflex to Free T4 if abnormal  - Vascular US lower extremity venous duplex bilateral; Future

## 2025-04-16 NOTE — ASSESSMENT & PLAN NOTE
Patient is inquiring whether or not he can go back on oral medication for his onychomycosis.  He does have some new growth of good nail in the nailbed.  I feel at this time he would be a better candidate for cream, rather than continuing the oral medication at this time.  - If he does not have good response and continued good response of his nail, we can transition him back to oral treatment, pending his LFTs.  - ciclopirox (Loprox) 0.77 % cream; Apply topically 2 times a day. Use for 4 weeks.  Dispense: 90 g; Refill: 2

## 2025-04-16 NOTE — ASSESSMENT & PLAN NOTE
1. Factor V Leiden (Multi) (Primary)  Patient has a history of factor V Leiden, he currently follows with [facility].  He has been complaining of some pain in his varicose veins and feels like his veins are hard to the touch.  He has not had erythema or swelling in the leg, but notices just over the veins themselves it is uncomfortable.  - We will obtain blood work, as he has not had any recently.  - We will also obtain ultrasound of the leg.  - He is not currently on anticoagulation, so if he does have any abnormal findings or concerns for blood clots he would be to start anticoagulation.  He was on warfarin in the past.  - Will obtain a vascular surgery referral.  - CBC and Auto Differential; Future  - Comprehensive Metabolic Panel; Future  - Hemoglobin A1C; Future  - Lipid Panel; Future  - TSH with reflex to Free T4 if abnormal; Future  - Follow Up In [facility] - PCP - Health Maintenance; Future  - Referral to Vascular Surgery; Future  - Vascular US lower extremity venous duplex bilateral; Future

## 2025-04-18 ENCOUNTER — OFFICE VISIT (OUTPATIENT)
Dept: PRIMARY CARE | Facility: CLINIC | Age: 60
End: 2025-04-18
Payer: COMMERCIAL

## 2025-04-18 VITALS
SYSTOLIC BLOOD PRESSURE: 119 MMHG | TEMPERATURE: 97.4 F | HEART RATE: 64 BPM | BODY MASS INDEX: 27.34 KG/M2 | WEIGHT: 174.2 LBS | DIASTOLIC BLOOD PRESSURE: 73 MMHG | OXYGEN SATURATION: 95 % | HEIGHT: 67 IN

## 2025-04-18 DIAGNOSIS — I82.461 ACUTE DEEP VEIN THROMBOSIS (DVT) OF CALF MUSCLE VEIN OF RIGHT LOWER EXTREMITY: ICD-10-CM

## 2025-04-18 DIAGNOSIS — D68.51 FACTOR V LEIDEN (MULTI): Primary | ICD-10-CM

## 2025-04-18 DIAGNOSIS — B35.1 ONYCHOMYCOSIS: ICD-10-CM

## 2025-04-18 PROCEDURE — 3008F BODY MASS INDEX DOCD: CPT | Performed by: STUDENT IN AN ORGANIZED HEALTH CARE EDUCATION/TRAINING PROGRAM

## 2025-04-18 PROCEDURE — 99214 OFFICE O/P EST MOD 30 MIN: CPT | Performed by: STUDENT IN AN ORGANIZED HEALTH CARE EDUCATION/TRAINING PROGRAM

## 2025-04-18 NOTE — PROGRESS NOTES
"FAMILY MEDICINE  OFFICE VISIT   Barrett Ha  [id number]  1965    PCP: Iris Bar DO     Chief Complaint:   Chief Complaint   Patient presents with    Dvt     Pt presents for possible DVT- pt states that he started the eliquis yesterday, no concerns/questions at this time.     SUBJECTIVE     Barrett Ha is a 59 y.o. ***-speaking male with pertinent PMHx of ***, who presents to the clinic with complaints of ***.    Toe   - Ciclopirox is   - Oral never this b    Left Pinky  - Broke Monday   - Fell on it.     Right DVT  - Soleal v non-occlusive   - About 15 years ago woke up at 3am and felt like calf had a heart attack. Around 2007 and 2008.   - On the Eliquis     Sister worked at [employer], [place] for Heart Surgery. High level administration, at the [employer].       PLAN   - Eliquis   -     HPI      The following portions of the patient's chart were reviewed in this encounter and updated as appropriate:         Home Medication List:  Current Outpatient Medications   Medication Instructions    apixaban (Eliquis) 5 mg (74 tabs) tablet Take 2 tablets (10 mg) by mouth 2 times a day for 7 days, then take 1 tablet (5 mg) by mouth 2 times a day.    ciclopirox (Loprox) 0.77 % cream Topical, 2 times daily, Use for 4 weeks.    EPINEPHrine 0.3 mg/0.3 mL injection syringe Inject as needed for severe allergy symptoms    tadalafil 20 mg tablet Take 1 to 1.5 tablets as directed.  Requests good Rx discount please         OBJECTIVE   /73 (BP Location: Left arm, Patient Position: Sitting, BP Cuff Size: Adult)   Pulse 64   Temp 36.3 °C (97.4 °F) (Temporal)   Ht 1.702 m (5' 7\")   Wt 79 kg (174 lb 3.2 oz)   SpO2 95%   BMI 27.28 kg/m²   Vital signs and pulse oximetry reviewed.     Physical Exam  ***    ASSESSMENT & PLAN     Problem List Items Addressed This Visit    None      ***    Follow-Up Recommendations: ***    Please excuse any typos or grammatical errors, part of this note was constructed with Dragon dictation " software.    Iris Bar DO, MSEd  [facility]   Office: [phone]  4/18/2025 10:03 AM     Patient also already referred to vascular sx for varicose veins.   - We discussed strict precautions of when patient should present to ED for evaluation, patient voiced their understanding.          Relevant Medications    apixaban (Eliquis) 5 mg tablet    Factor V Leiden (Multi) - Primary    Relevant Medications    apixaban (Eliquis) 5 mg tablet    Onychomycosis    Current Assessment & Plan   Continue with topical ciclopirox.             Level 4    Follow-Up Recommendations: 3mo    Please excuse any typos or grammatical errors, part of this note was constructed with Dragon dictation software.    Iris Bar DO, Ramana  [facility]   Office: [phone]  4/18/2025 5:54 PM

## 2025-04-18 NOTE — PATIENT INSTRUCTIONS
- Call Josué Rowland's direct phone # [phone] to schedule your referrals, imaging, or tests. If she is unavailable,  you can call Central Referral Management at [phone].  - Appt for Vascular Surgeon    Start taking the new eliquis prescription once you are done with the Starter Pack of Eliquis.     
English

## 2025-04-18 NOTE — Clinical Note
You are seeing patient in June, ended up having incidental DVT noted in right leg. We started him on Eliquis last week. I talked to patient about how this is likely lifelong AC now given, second clot.

## 2025-04-21 NOTE — ASSESSMENT & PLAN NOTE
Patient was started on Eliquis starter pack this week after incidental findings of age indeterminant clot in RIGHT soleal vein. He has known FVL, follows with Heme Dr. Darnell.   - Will send continuation pack of Elqiuis today, was already approved via PA.   - Continue starter pack until run out and then transition to BID dosing pack.   - Patient has appt with onc in June. Will send notes to Dr. Darnell.   - This is his 2nd clot now, previously on Warfarin in the past. We discussed today likely need for lifelong anticoagulation.   - Patient also already referred to vascular sx for varicose veins.   - We discussed strict precautions of when patient should present to ED for evaluation, patient voiced their understanding.

## 2025-04-25 ENCOUNTER — OFFICE VISIT (OUTPATIENT)
Dept: HEMATOLOGY/ONCOLOGY | Facility: CLINIC | Age: 60
End: 2025-04-25
Payer: COMMERCIAL

## 2025-04-25 VITALS
TEMPERATURE: 97 F | WEIGHT: 172.62 LBS | SYSTOLIC BLOOD PRESSURE: 121 MMHG | BODY MASS INDEX: 27.04 KG/M2 | HEART RATE: 57 BPM | DIASTOLIC BLOOD PRESSURE: 76 MMHG | RESPIRATION RATE: 12 BRPM | OXYGEN SATURATION: 95 %

## 2025-04-25 DIAGNOSIS — R79.0 ABNORMAL IRON SATURATION: ICD-10-CM

## 2025-04-25 DIAGNOSIS — Z83.49 FAMILY HISTORY OF HEMOCHROMATOSIS: ICD-10-CM

## 2025-04-25 PROCEDURE — G2211 COMPLEX E/M VISIT ADD ON: HCPCS | Performed by: INTERNAL MEDICINE

## 2025-04-25 PROCEDURE — 99214 OFFICE O/P EST MOD 30 MIN: CPT | Performed by: INTERNAL MEDICINE

## 2025-04-25 ASSESSMENT — PAIN SCALES - GENERAL: PAINLEVEL_OUTOF10: 0-NO PAIN

## 2025-04-25 NOTE — PROGRESS NOTES
Patient to continue Eliquis 5mg BID.  Repeat BL US prior to FUV in 6 months.  Fasting labs in next several weeks.  Patient will come to [facility] and will combine with PCP Dr Sanches's labs.  Patient inquiring about OTC pain medication that is acceptable while on Eliquis.  Will discuss with provider and follow-up.  Call back instructions reviewed.  Patient verbalized understanding.   Addendum: Provider changed FUV and US to 3 months.  US also changed to only RLE.      ORDERS & PATIENT INSTRUCTIONS:     Continue Eliquis 5 mg p.o. twice daily at this time  Return for follow-up in 3-month with right lower extremity ultrasound    MD Jess Jones, RN  Patient can take occasional Aleve or Advil without any problem but on a regular basis he should avoid any NSAID on a longer term especially it can cause potential peptic ulcer disease and potential bleeding.    Call placed to patient.  Patient notified of change in recommendation to 3 mos US RLE with FUV to follow.  Patient also notified of providers recommendation in regards to NSAIDS.  Call back instructions reviewed.  Patient verbalized understanding.

## 2025-04-25 NOTE — PROGRESS NOTES
"Patient ID: Barrett Ha is a 59 y.o. male.  Referring Physician: Von Darnell MD  [address] [facility] [address]  [place] [address]  Primary Care Provider: Iris Bar DO      ORDERS & PATIENT INSTRUCTIONS:    Continue Eliquis 5 mg p.o. twice daily at this time  Return for follow-up in 3-month with right lower extremity ultrasound            CONSULTING PHYSICIAN:  Iris Bar DO      REASON FOR CONSULTATION:    DVT  HISTORY OF PRESENT ILLNESS:    Patient is a 59-year-old white man with past medical history of superficial thrombophlebitis in 2008, factor V Leiden mutation, varicose vein in the lower extremities and elevated iron saturation of 57% with ferritin of 182 and 2 half sisters with sisters with Hemochromatosis, who was evaluated and was negative for C282Y mutation as well as H63D mutation of hemochromatosis.  S65C mutation was ordered although that is a send out test and is part of the panel, so it cannot be done as a individual test, so has been on watchful waiting who had a routine physical exam with Dr. Bar on 24 March 2025 and was found to have significant varicose vein bilateral lower extremity and some of those were tender, so He had a bilateral lower extremity ultrasound done on April 15, 2025 and was found to have age-indeterminate DVT in the soleal vein on the right leg, left leg was without any DVT.  Patient was placed on Eliquis and I been asked to further evaluate.  Patient claims that he was clinically asymptomatic and did not have any major symptoms related to this DVT    -Patient was taking Arimidex 1 mg p.o. twice a week and clomiphene citrate 35 mg, 2 tablets p.o. daily from January 2024 to \"help increasing testosterone\" from online pharmacy          PAST MEDICAL HISTORY:  History of factor V Leiden mutation, further details unclear, he had a varicose vein and superficial thrombophlebitis in 2008 and at that time he was worked up at [facility] by " me although further details unclear and my records are not available at this time.  He was given short-term Coumadin and later was discontinued.    History of alcohol use and was drinking 4-6 beers (IPA with higher percentages) daily from age 18 through 56 and then he quit   Age undetermined at DVT of soleal vein on the right leg in April 2025          REVIEW OF SYSTEMS:    Decreased libido, morning stiffness occasional joint swelling, ringing in the ear.  Patient declines any leg swelling or pain, denies any chest pain, rest of the 10 review of system is negative and as per HPI    PHYSICAL EXAM:  Objective   BSA: 1.92 meters squared,   /76   Pulse 57   Temp 36.1 °C (97 °F)   Resp 12   Wt 78.3 kg (172 lb 9.9 oz)   SpO2 95%   BMI 27.04 kg/m²   Gen:  Conscious,  no acute distress,   HEENT: Normocephalic, no icterus. . No nasal discharge,  Oral mucosa moist  Chest: Bilateral symmetrical, Bilateral AE        CVS: S1S2.   Abdomen: Soft, no guarding or rigidity BS+   Extremities: No C/C, bilateral lower extremity significant varicose veins noted  Skin: No petechiae        CURRENT MEDICATION:  Current Outpatient Medications   Medication Sig Dispense Refill    apixaban (Eliquis) 5 mg (74 tabs) tablet Take 2 tablets (10 mg) by mouth 2 times a day for 7 days, then take 1 tablet (5 mg) by mouth 2 times a day. 74 tablet 0    apixaban (Eliquis) 5 mg tablet Take 1 tablet (5 mg) by mouth 2 times a day. 180 tablet 3    ciclopirox (Loprox) 0.77 % cream Apply topically 2 times a day. Use for 4 weeks. 90 g 2    EPINEPHrine 0.3 mg/0.3 mL injection syringe Inject as needed for severe allergy symptoms 2 each 3    tadalafil 20 mg tablet Take 1 to 1.5 tablets as directed.  Requests good Rx discount please 45 tablet 3     No current facility-administered medications for this visit.           LABS:    CBC:  Recent Labs     08/29/24  0847 09/09/22  0805 07/01/20  1458   WBC 5.9 4.9 6.0   HGB 15.0 15.4 15.2   HCT 46.7 45.6 44.1  "   185 222   MCV 92 90 90       CMP:  Recent Labs     08/29/24  0847 09/09/22  0805 07/01/20  1458    140 138   K 3.9 4.4 3.9    104 103   CO2 26 28 27   ANIONGAP 13 12 12   BUN 11 12 20   CREATININE 0.99 0.88 0.96   EGFR 89  --   --      Recent Labs     08/29/24  0847 09/09/22  0805   ALBUMIN 4.4 4.7   ALKPHOS 44 68   ALT 22 17   AST 18 15   BILITOT 0.8 0.9       HEME/ENDO:  Recent Labs     08/29/24  0847 09/09/22  0805   FERRITIN 182  --    IRONSAT 57*  --    TSH 0.86 0.95   [id number] 374  --    FOLATE 11.0  --         MICRO: No results for input(s): \"ESR\", \"CRP\", \"PROCAL\" in the last 58845 hours.  No results found for the last 90 days.        TUMOR MARKERS:  PSA (ng/mL)   Date Value   11/03/2020 0.36                IMAGING:         Vascular US lower extremity venous duplex bilateral                   [facility]  [address], [address], [facility] [address]                   Tel [phone]       Vascular Lab Report  [facility] US LOWER EXTREMITY VENOUS DUPLEX BILATERAL       Patient Name:      TIAGO PEDERSENFARMER        Reading Physician:  [id number] Tesha Wesley MD  Study Date:        4/15/2025            Ordering Physician: [id number] ZARATE  MRN/PID:           [id number]             Technologist:       Serena Fong RVT  Accession#:        [id number]         Technologist 2:  Date of Birth/Age: 1965 / 59 years Encounter#:         [id number]  Gender:            M  Admission Status:  Outpatient           Location Performed: [facility]       Diagnosis/ICD: Varicose veins of bilateral lower extremities with other                 complications-I83.893  Indication:    Varicose veins edema, pain  CPT Codes:     03561 Peripheral venous duplex scan for DVT complete       **CRITICAL RESULT**  Critical Result: Right soleal vein age indeterminate " thrombus  Notification called to Dr. Bar on 4/15/2025 at 10:33:44 AM. Acknowledged critical results notification communicated via phone by Serena Fong RVT.     CONCLUSIONS:  Right Lower Venous: There is age indeterminate deep vein thrombosis visualized in the soleal vein. The remainder of the right leg is negative for deep vein thrombosis. The right great saphenous vein and its varicosities demonstrate chronic changes from proximal to distal thigh.  Left Lower Venous: No evidence of acute deep vein thrombus visualized in the left lower extremity.     Imaging & Doppler Findings:     Right                 Compressible Thrombus        Flow  Distal External Iliac                None  CFV                       Yes        None   Spontaneous/Phasic  PFV                       Yes        None  FV Proximal               Yes        None   Spontaneous/Phasic  FV Mid                    Yes        None  FV Distal                 Yes        None  Popliteal                 Yes        None   Spontaneous/Phasic  Peroneal                  Yes        None  PTV                       Yes        None  Soleal                     No       ? Age       Left                  Compress Thrombus        Flow  Distal External Iliac                   Spontaneous/Phasic  CFV                     Yes      None   Spontaneous/Phasic  PFV                     Yes      None  FV Proximal             Yes      None   Spontaneous/Phasic  FV Mid                  Yes      None  FV Distal               Yes      None  Popliteal               Yes      None   Spontaneous/Phasic  Peroneal                Yes      None  PTV                     Yes      None       [id number] Tesha Wesley MD  Electronically signed by [id number] Tesha Wesley MD on 4/18/2025 at 11:21:31 PM       ** Final **               SOCIAL HISTORY:    reports that he does not currently use alcohol.   reports no history of drug use.,   Tobacco Use: Low Risk  (4/18/2025)    Patient History      Smoking Tobacco Use: Never     Smokeless Tobacco Use: Never     Passive Exposure: Not on file   Patient denies smoking, he drank 4-6 IPA beers with higher percentages daily from [age] through 56, quit 3 years ago.    FAMILY HISTORY:  Family History   Problem Relation Name Age of Onset    Dementia Mother Cecile guerrero     Blood Disorder Mother Cecile guerrero     Clotting disorder Mother Cecile guerrero     Hemochromatosis Sister      Hemochromatosis Sister      Stroke Father's Brother  60    Heart attack Paternal Grandfather          heavy smoker   2 half sisters who has same mother but different father has hemochromatosis, further details unclear at this time..  ALLERGY:   Patient has no known allergies.          ASSESSMENT & PLAN:  59-year-old white man with History of factor V Leiden mutation and history of superficial thrombophlebitis in the past who has been on Arimidex and clomiphene citrate since January 2024 to improve his testosterone level although has not been on any hormonal injections who was not very symptomatic but on a physical exam he had a tenderness on the varicose veins so lower extremity ultrasound done and showed age undetermined and DVT of soleal vein/calf vein of right leg.  Exact etiology remains unclear but for now agree with approximately 3 months of anticoagulation with Eliquis and will repeat lower extremity ultrasound and if it is negative, we could consider stopping it especially I believe he is calf vein DVT seems to be more likely related to his chronic varicose veins as well as possibly related to clomiphene citrate which patient was taking since January 2024 in addition to factor V Leiden mutation.  I have advised him to discontinue Arimidex as well as clomiphene citrate, continue Eliquis for now, will recheck right lower extremity ultrasound in 3 months and patient will now return for follow-up.  I also wonder whether patient will benefit from seeing a vascular surgeon to see if  there would be any role of varicose vein ablation as he seemed to have significant bilateral lower extremity varicose veins.        Patient has a family history of hemochromatosis with 2 half sisters with hemochromatosis although patient's C282Y mutation and H63D mutation is negative, although S65C mutation is not tested, as long as we can monitor his ferritin which remains less than 300, I do not believe he requires any additional management.  Patient was offered blood donations although he tried it but then after he finished blood donation he had syncopal episode and does not like to try it again.  For now we will continue watchful waiting with periodic fasting iron studies      Medication reviewed in e-chart  Patient is monitored for medication toxicity  labs reviewed and interpreted independently, X rays independently reviewed  Notes from other physicians involved in care were reviewed      Charting was completed using voice recognition technology and may include unintended errors.    AL DUVALL MD, JOSE.    Amber Blanca Master Clinician in Hematology and Oncology  Medical Director, [facility] at [facility].  [place] office  Phone [phone]  Fax      [phone]    [facility] /[place].  Phone [phone]  Fax     [phone]

## 2025-04-25 NOTE — PATIENT INSTRUCTIONS
ORDERS & PATIENT INSTRUCTIONS:     Continue Eliquis 5 mg p.o. twice daily at this time  Return for follow-up in 6-month with bilateral ultrasound     Fasting iron group and ferritin and Please do hemochromatosis test  S65C (only a send out test) and do not do any other mutation such as c2 82Y and H63D mutation tested twice

## 2025-06-10 ENCOUNTER — HOSPITAL ENCOUNTER (OUTPATIENT)
Dept: RADIOLOGY | Facility: CLINIC | Age: 60
Discharge: HOME | End: 2025-06-10
Payer: COMMERCIAL

## 2025-06-10 DIAGNOSIS — Z82.3 FAMILY HISTORY OF CVA: ICD-10-CM

## 2025-06-10 DIAGNOSIS — Z82.49 FAMILY HISTORY OF CARDIAC ARREST: ICD-10-CM

## 2025-06-10 DIAGNOSIS — E78.5 HYPERLIPIDEMIA, UNSPECIFIED HYPERLIPIDEMIA TYPE: ICD-10-CM

## 2025-06-10 PROCEDURE — 75571 CT HRT W/O DYE W/CA TEST: CPT

## 2025-06-17 ENCOUNTER — APPOINTMENT (OUTPATIENT)
Dept: HEMATOLOGY/ONCOLOGY | Facility: CLINIC | Age: 60
End: 2025-06-17
Payer: COMMERCIAL

## 2025-07-15 PROBLEM — I25.10 CORONARY ARTERY DISEASE INVOLVING NATIVE CORONARY ARTERY OF NATIVE HEART WITHOUT ANGINA PECTORIS: Status: ACTIVE | Noted: 2025-07-15

## 2025-07-15 PROBLEM — R93.1 AGATSTON CAC SCORE, <100: Status: ACTIVE | Noted: 2025-07-15

## 2025-07-21 ENCOUNTER — HOSPITAL ENCOUNTER (OUTPATIENT)
Dept: VASCULAR MEDICINE | Facility: CLINIC | Age: 60
Discharge: HOME | End: 2025-07-21
Payer: COMMERCIAL

## 2025-07-21 DIAGNOSIS — Z83.49 FAMILY HISTORY OF HEMOCHROMATOSIS: ICD-10-CM

## 2025-07-21 DIAGNOSIS — R79.0 ABNORMAL IRON SATURATION: ICD-10-CM

## 2025-07-21 PROCEDURE — 93971 EXTREMITY STUDY: CPT

## 2025-07-21 PROCEDURE — 93971 EXTREMITY STUDY: CPT | Performed by: STUDENT IN AN ORGANIZED HEALTH CARE EDUCATION/TRAINING PROGRAM

## 2025-07-22 ENCOUNTER — OFFICE VISIT (OUTPATIENT)
Dept: HEMATOLOGY/ONCOLOGY | Facility: CLINIC | Age: 60
End: 2025-07-22
Payer: COMMERCIAL

## 2025-07-22 VITALS
SYSTOLIC BLOOD PRESSURE: 147 MMHG | WEIGHT: 172.73 LBS | HEART RATE: 53 BPM | OXYGEN SATURATION: 97 % | TEMPERATURE: 97.2 F | RESPIRATION RATE: 16 BRPM | DIASTOLIC BLOOD PRESSURE: 79 MMHG | BODY MASS INDEX: 27.05 KG/M2

## 2025-07-22 DIAGNOSIS — R79.0 ABNORMAL IRON SATURATION: ICD-10-CM

## 2025-07-22 DIAGNOSIS — D68.51 FACTOR V LEIDEN (MULTI): ICD-10-CM

## 2025-07-22 DIAGNOSIS — Z83.49 FAMILY HISTORY OF HEMOCHROMATOSIS: ICD-10-CM

## 2025-07-22 DIAGNOSIS — I82.461 ACUTE DEEP VEIN THROMBOSIS (DVT) OF CALF MUSCLE VEIN OF RIGHT LOWER EXTREMITY: ICD-10-CM

## 2025-07-22 PROCEDURE — 85025 COMPLETE CBC W/AUTO DIFF WBC: CPT

## 2025-07-22 PROCEDURE — 83550 IRON BINDING TEST: CPT

## 2025-07-22 PROCEDURE — 82728 ASSAY OF FERRITIN: CPT

## 2025-07-22 PROCEDURE — 99214 OFFICE O/P EST MOD 30 MIN: CPT | Performed by: INTERNAL MEDICINE

## 2025-07-22 PROCEDURE — 83540 ASSAY OF IRON: CPT

## 2025-07-22 PROCEDURE — 1036F TOBACCO NON-USER: CPT | Performed by: INTERNAL MEDICINE

## 2025-07-22 ASSESSMENT — PAIN SCALES - GENERAL: PAINLEVEL_OUTOF10: 0-NO PAIN

## 2025-07-22 NOTE — PROGRESS NOTES
"Patient ID: Barrett Ha is a 59 y.o. male.  Referring Physician: Von Darnell MD  [address], [address] [place] [address]  Primary Care Provider: Iris Bar DO      ORDERS & PATIENT INSTRUCTIONS:    Patient to switch to Eliquis 2.5 mg p.o. twice daily  Patient to see either Dr. Haynes for vascular surgery for varicose vein    Return for follow-up in 6-month      ASSESSMENT, PROBLEM LIST, DECISION MAKING, PLAN.    Acute versus subacute DVT of soleal vein/calf vein of right leg in April 2025.  Patient was on Arimidex and clomiphene citrate since January 2024 to increase the level of testosterone level.  (-Patient was taking Arimidex 1 mg p.o. twice a week and clomiphene citrate 35 mg, 2 tablets p.o. daily from January 2024 to \"help increasing testosterone\" from online pharmacy)  -History of factor V Leiden mutation and history of superficial thrombophlebitis in 2008   - Patient was recommended 3 months of anticoagulation with Eliquis 5 mg p.o. twice daily and then was switched to 2.5 mg p.o. twice daily in July 2025    significant bilateral lower extremity varicose vein-vascular surgery Dr. Haynes was consulted          PAST MEDICAL HISTORY:  History of factor V Leiden mutation, further details unclear,   he had a varicose vein and superficial thrombophlebitis in 2008 and at that time he was worked up at [facility] by me although further details unclear and my records are not available at this time.    He was given short-term Coumadin and later was discontinued.    History of alcohol use and was drinking 4-6 beers (IPA with higher percentages) daily from age 18 through 56 and then he quit     Elevated iron saturation of 57% and ferritin of 122 and 2 half sisters with sisters with Hemochromatosis, who was evaluated and was negative for C282Y mutation as well as H63D mutation of hemochromatosis.  S65C mutation was not done.              PHYSICAL EXAM:  Objective   BSA: 1.93 " meters squared,   /79   Pulse 53   Temp 36.2 °C (97.2 °F)   Resp 16   Wt 78.4 kg (172 lb 11.7 oz)   SpO2 97%   BMI 27.05 kg/m²   Gen:  Conscious,  no acute distress,   HEENT: Normocephalic, no icterus. . No nasal discharge,  Oral mucosa moist  Chest: Bilateral symmetrical, Bilateral AE        CVS: S1S2.   Abdomen: Soft, no guarding or rigidity BS+   Extremities: No C/C, bilateral lower extremity significant varicose veins noted  Skin: No petechiae          ASSESSMENT & PLAN:  Patient with History of factor V Leiden mutation and history of superficial thrombophlebitis in the past who has been on Arimidex and clomiphene citrate since January 2024 to improve his testosterone level although has not been on any hormonal injections who was not very symptomatic but on a physical exam he had a tenderness on the varicose veins so lower extremity ultrasound done and showed age undetermined and DVT of soleal vein/calf vein of right leg.    - Patient was given Eliquis 5 mg p.o. twice daily for 3 months,  Repeat ultrasound done yesterday preliminary showed chronic changes in the soleal vein on the right side but otherwise was negative for any venous thromboembolism.  As patient has a factor V Leiden mutation and has chronic soleal thrombotic changes, will give him Eliquis 2.5 mg p.o. twice daily in the meantime patient has significant varicose vein and I have recommended him to see vascular surgeon to see if there would be any benefit of ablation of varicose vein which could potentially reduce risk of thrombosis in the future.  Patient is going to make an appointment with Dr. Hay[facility] at [facility]    I have advised him to discontinue Arimidex as well as clomiphene citrate,       Patient has a family history of hemochromatosis with 2 half sisters with hemochromatosis although patient's C282Y mutation and H63D mutation is negative, although S65C mutation is not tested, as long as we can monitor his ferritin which  remains less than 300, I do not believe he requires any additional management.   If it goes above 300, he could consider phlebotomies/blood donation via blood bank although he tried it but then after he finished blood donation he had syncopal episode and does not like to try it again.  For now we will continue watchful waiting with periodic fasting iron studies      Patient will now return for follow-up in 6 months        CURRENT MEDICATION:  Current Outpatient Medications   Medication Sig Dispense Refill    apixaban (Eliquis) 5 mg (74 tabs) tablet Take 2 tablets (10 mg) by mouth 2 times a day for 7 days, then take 1 tablet (5 mg) by mouth 2 times a day. (Patient taking differently: Take 0.5 tablets (2.5 mg) by mouth 2 times a day. Take 2 tablets (10 mg) by mouth 2 times a day for 7 days, then take 1 tablet (5 mg) by mouth 2 times a day.) 74 tablet 0    apixaban (Eliquis) 5 mg tablet Take 1 tablet (5 mg) by mouth 2 times a day. 180 tablet 3    ciclopirox (Loprox) 0.77 % cream Apply topically 2 times a day. Use for 4 weeks. 90 g 2    EPINEPHrine 0.3 mg/0.3 mL injection syringe Inject as needed for severe allergy symptoms 2 each 3    tadalafil 20 mg tablet Take 1 to 1.5 tablets as directed.  Requests good Rx discount please 45 tablet 3     No current facility-administered medications for this visit.           LABS:    CBC:  Recent Labs     08/29/24  0847 09/09/22  0805 07/01/20  1458   WBC 5.9 4.9 6.0   HGB 15.0 15.4 15.2   HCT 46.7 45.6 44.1    185 222   MCV 92 90 90       CMP:  Recent Labs     08/29/24  0847 09/09/22  0805 07/01/20  1458    140 138   K 3.9 4.4 3.9    104 103   CO2 26 28 27   ANIONGAP 13 12 12   BUN 11 12 20   CREATININE 0.99 0.88 0.96   EGFR 89  --   --      Recent Labs     08/29/24  0847 09/09/22  0805   ALBUMIN 4.4 4.7   ALKPHOS 44 68   ALT 22 17   AST 18 15   BILITOT 0.8 0.9       HEME/ENDO:  Recent Labs     08/29/24  0847 09/09/22  0805   FERRITIN 182  --    IRONSAT 57*  --   "  TSH 0.86 0.95   [id number] 374  --    FOLATE 11.0  --         MICRO: No results for input(s): \"ESR\", \"CRP\", \"PROCAL\" in the last 53354 hours.  No results found for the last 90 days.        TUMOR MARKERS:  PSA (ng/mL)   Date Value   11/03/2020 0.36                IMAGING:         Vascular US lower extremity venous duplex right  Preliminary Cardiology Report                   [facility]  [address], [address], [place] [address]                   Tel [phone]             Preliminary Vascular Lab Report     [facility] US LOWER EXTREMITY VENOUS DUPLEX RIGHT       Patient Name:      TIAGO FARMER Reading Physician:  [id number] Miranda Perez MD  Study Date:        7/21/2025     Ordering Physician: [id number] AL DUVALL  MRN/PID:           [id number]      Technologist:       Ondina Stoddard RVT, [facility]  Accession#:        [id number]  Technologist 2:  Date of Birth/Age: 1965     Encounter#:         [id number]  Gender:            M  Admission Status:  Outpatient    Location Performed: [facility]       Diagnosis/ICD: History of DVT-I82.56  Procedure/CPT: 85437 Peripheral venous duplex scan for DVT Limited       PRELIMINARY CONCLUSIONS:  Right Lower Venous: There are chronic changes visualized in the soleal vein. The remainder of the right leg is negative for deep vein thrombosis.  Left Lower Venous: The left common femoral vein demonstrates normal spontaneous and respirophasic flow.     Imaging & Doppler Findings:     Right                 Compressible Thrombus        Flow  Distal External Iliac                None  CFV                       Yes        None   Spontaneous/Phasic  PFV                       Yes        None  FV Proximal               Yes        None   Spontaneous/Phasic  FV Mid                    Yes        None  FV Distal                 Yes        None  Popliteal                 Yes        None   " Spontaneous/Phasic  Peroneal                  Yes        None  PTV                       Yes        None  Soleal                  Partial    Chronic       Left        Flow  CFV  Spontaneous/Phasic    VASCULAR PRELIMINARY REPORT  completed by Ondina Stoddard RVT, VENU on 7/21/2025 at 10:28:52 AM       ** Final **               SOCIAL HISTORY:    reports that he does not currently use alcohol.   reports no history of drug use.,   Tobacco Use: Low Risk  (5/28/2025)    Received from [facility]    Patient History     Smoking Tobacco Use: Never     Smokeless Tobacco Use: Never     Passive Exposure: Not on file   Patient denies smoking, he drank 4-6 IPA beers with higher percentages daily from [age] through 56, quit 3 years ago.    FAMILY HISTORY:  Family History   Problem Relation Name Age of Onset    Dementia Mother Cecile guerrero     Blood Disorder Mother Cecile guerrero     Clotting disorder Mother Cecile guerrero     Hemochromatosis Sister      Hemochromatosis Sister      Stroke Father's Brother  60    Heart attack Paternal Grandfather          heavy smoker   2 half sisters who has same mother but different father has hemochromatosis, further details unclear at this time..  ALLERGY:   Patient has no known allergies.          Medication reviewed in e-chart  Patient is monitored for medication toxicity  labs reviewed and interpreted independently, X rays independently reviewed  Notes from other physicians involved in care were reviewed      Charting was completed using voice recognition technology and may include unintended errors.    AL DUVALL MD, JOSE.    Amber Blanca Master Clinician in Hematology and Oncology  Medical Director, [facility] at [facility].  [place] office  Phone [phone]  Fax      [phone]    [facility] /[place].  Phone [phone]  Fax     [phone]

## 2025-07-22 NOTE — PROGRESS NOTES
Patient to switch Eliquis to 2.5mg BID.  RX to be sent to [facility] per patient request.  Contact information for Dr Haynes provided.  Patient to reach out to schedule surgery for varicose vein consult.  Follow-up in 6 months.  Call back instructions reviewed.  Patient verbalized understanding.

## 2025-07-23 ENCOUNTER — PATIENT MESSAGE (OUTPATIENT)
Dept: HEMATOLOGY/ONCOLOGY | Facility: CLINIC | Age: 60
End: 2025-07-23
Payer: COMMERCIAL

## 2025-07-23 ENCOUNTER — LAB (OUTPATIENT)
Dept: LAB | Facility: HOSPITAL | Age: 60
End: 2025-07-23
Payer: COMMERCIAL

## 2025-07-23 DIAGNOSIS — R79.0 ABNORMAL IRON SATURATION: ICD-10-CM

## 2025-07-23 DIAGNOSIS — Z83.49 FAMILY HISTORY OF HEMOCHROMATOSIS: ICD-10-CM

## 2025-07-23 LAB
ALBUMIN SERPL-MCNC: 4.6 G/DL (ref 3.6–5.1)
ALP SERPL-CCNC: 61 U/L (ref 35–144)
ALT SERPL-CCNC: 22 U/L (ref 9–46)
ANION GAP SERPL CALCULATED.4IONS-SCNC: 9 MMOL/L (CALC) (ref 7–17)
AST SERPL-CCNC: 21 U/L (ref 10–35)
BASOPHILS # BLD AUTO: 0.04 X10*3/UL (ref 0–0.1)
BASOPHILS # BLD AUTO: 40 CELLS/UL (ref 0–200)
BASOPHILS NFR BLD AUTO: 0.9 %
BASOPHILS NFR BLD AUTO: 0.9 %
BILIRUB SERPL-MCNC: 1 MG/DL (ref 0.2–1.2)
BUN SERPL-MCNC: 13 MG/DL (ref 7–25)
CALCIUM SERPL-MCNC: 9.4 MG/DL (ref 8.6–10.3)
CHLORIDE SERPL-SCNC: 107 MMOL/L (ref 98–110)
CHOLEST SERPL-MCNC: 215 MG/DL
CHOLEST/HDLC SERPL: 3.7 (CALC)
CO2 SERPL-SCNC: 25 MMOL/L (ref 20–32)
CREAT SERPL-MCNC: 0.89 MG/DL (ref 0.7–1.3)
EGFRCR SERPLBLD CKD-EPI 2021: 100 ML/MIN/1.73M2
EOSINOPHIL # BLD AUTO: 0.21 X10*3/UL (ref 0–0.7)
EOSINOPHIL # BLD AUTO: 211 CELLS/UL (ref 15–500)
EOSINOPHIL NFR BLD AUTO: 4.5 %
EOSINOPHIL NFR BLD AUTO: 4.8 %
ERYTHROCYTE [DISTWIDTH] IN BLOOD BY AUTOMATED COUNT: 12.2 % (ref 11.5–14.5)
ERYTHROCYTE [DISTWIDTH] IN BLOOD BY AUTOMATED COUNT: 13 % (ref 11–15)
EST. AVERAGE GLUCOSE BLD GHB EST-MCNC: 108 MG/DL
EST. AVERAGE GLUCOSE BLD GHB EST-SCNC: 6 MMOL/L
FERRITIN SERPL-MCNC: 103 NG/ML (ref 20–300)
GLUCOSE SERPL-MCNC: 92 MG/DL (ref 65–99)
HBA1C MFR BLD: 5.4 %
HCT VFR BLD AUTO: 48.2 % (ref 38.5–50)
HCT VFR BLD AUTO: 48.6 % (ref 41–52)
HDLC SERPL-MCNC: 58 MG/DL
HGB BLD-MCNC: 15.5 G/DL (ref 13.5–17.5)
HGB BLD-MCNC: 15.6 G/DL (ref 13.2–17.1)
HOLD SPECIMEN: NORMAL
IMM GRANULOCYTES # BLD AUTO: 0.01 X10*3/UL (ref 0–0.7)
IMM GRANULOCYTES NFR BLD AUTO: 0.2 % (ref 0–0.9)
IRON SATN MFR SERPL: 52 % (ref 25–45)
IRON SERPL-MCNC: 169 UG/DL (ref 35–150)
LDLC SERPL CALC-MCNC: 136 MG/DL (CALC)
LYMPHOCYTES # BLD AUTO: 1.63 X10*3/UL (ref 1.2–4.8)
LYMPHOCYTES # BLD AUTO: 1540 CELLS/UL (ref 850–3900)
LYMPHOCYTES NFR BLD AUTO: 34.9 %
LYMPHOCYTES NFR BLD AUTO: 35 %
MCH RBC QN AUTO: 29.6 PG (ref 26–34)
MCH RBC QN AUTO: 30.1 PG (ref 27–33)
MCHC RBC AUTO-ENTMCNC: 31.9 G/DL (ref 32–36)
MCHC RBC AUTO-ENTMCNC: 32.4 G/DL (ref 32–36)
MCV RBC AUTO: 93 FL (ref 80–100)
MCV RBC AUTO: 93.1 FL (ref 80–100)
MONOCYTES # BLD AUTO: 0.29 X10*3/UL (ref 0.1–1)
MONOCYTES # BLD AUTO: 242 CELLS/UL (ref 200–950)
MONOCYTES NFR BLD AUTO: 5.5 %
MONOCYTES NFR BLD AUTO: 6.2 %
NEUTROPHILS # BLD AUTO: 2.49 X10*3/UL (ref 1.2–7.7)
NEUTROPHILS # BLD AUTO: 2367 CELLS/UL (ref 1500–7800)
NEUTROPHILS NFR BLD AUTO: 53.3 %
NEUTROPHILS NFR BLD AUTO: 53.8 %
NONHDLC SERPL-MCNC: 157 MG/DL (CALC)
NRBC BLD-RTO: 0 /100 WBCS (ref 0–0)
PLATELET # BLD AUTO: 206 THOUSAND/UL (ref 140–400)
PLATELET # BLD AUTO: 206 X10*3/UL (ref 150–450)
PMV BLD REES-ECKER: 11.9 FL (ref 7.5–12.5)
POTASSIUM SERPL-SCNC: 4.3 MMOL/L (ref 3.5–5.3)
PROT SERPL-MCNC: 6.9 G/DL (ref 6.1–8.1)
RBC # BLD AUTO: 5.18 MILLION/UL (ref 4.2–5.8)
RBC # BLD AUTO: 5.24 X10*6/UL (ref 4.5–5.9)
SODIUM SERPL-SCNC: 141 MMOL/L (ref 135–146)
TIBC SERPL-MCNC: 325 UG/DL (ref 240–445)
TRIGL SERPL-MCNC: 106 MG/DL
TSH SERPL-ACNC: 0.54 MIU/L (ref 0.4–4.5)
UIBC SERPL-MCNC: 156 UG/DL (ref 110–370)
WBC # BLD AUTO: 4.4 THOUSAND/UL (ref 3.8–10.8)
WBC # BLD AUTO: 4.7 X10*3/UL (ref 4.4–11.3)

## 2025-08-07 ENCOUNTER — APPOINTMENT (OUTPATIENT)
Dept: PRIMARY CARE | Facility: CLINIC | Age: 60
End: 2025-08-07
Payer: COMMERCIAL

## 2025-08-07 ASSESSMENT — PROMIS GLOBAL HEALTH SCALE
RATE_QUALITY_OF_LIFE: VERY GOOD
RATE_AVERAGE_PAIN: 5
RATE_GENERAL_HEALTH: GOOD
EMOTIONAL_PROBLEMS: RARELY
RATE_PHYSICAL_HEALTH: GOOD
RATE_SOCIAL_SATISFACTION: GOOD
RATE_AVERAGE_FATIGUE: MODERATE
CARRYOUT_SOCIAL_ACTIVITIES: GOOD
CARRYOUT_PHYSICAL_ACTIVITIES: MOSTLY
RATE_MENTAL_HEALTH: GOOD

## 2025-08-08 ENCOUNTER — APPOINTMENT (OUTPATIENT)
Dept: PRIMARY CARE | Facility: CLINIC | Age: 60
End: 2025-08-08
Payer: COMMERCIAL

## 2025-08-08 VITALS
SYSTOLIC BLOOD PRESSURE: 98 MMHG | DIASTOLIC BLOOD PRESSURE: 61 MMHG | OXYGEN SATURATION: 95 % | HEART RATE: 72 BPM | BODY MASS INDEX: 27.5 KG/M2 | WEIGHT: 175.2 LBS | HEIGHT: 67 IN | TEMPERATURE: 97.9 F

## 2025-08-08 DIAGNOSIS — I25.10 CORONARY ARTERY DISEASE INVOLVING NATIVE CORONARY ARTERY OF NATIVE HEART WITHOUT ANGINA PECTORIS: ICD-10-CM

## 2025-08-08 DIAGNOSIS — Z00.00 WELL ADULT EXAM: Primary | ICD-10-CM

## 2025-08-08 DIAGNOSIS — Z23 IMMUNIZATION DUE: ICD-10-CM

## 2025-08-08 DIAGNOSIS — D68.51 FACTOR V LEIDEN (MULTI): ICD-10-CM

## 2025-08-08 DIAGNOSIS — I83.893 VARICOSE VEINS OF BILATERAL LOWER EXTREMITIES WITH OTHER COMPLICATIONS: ICD-10-CM

## 2025-08-08 DIAGNOSIS — Z12.5 PROSTATE CANCER SCREENING: ICD-10-CM

## 2025-08-08 DIAGNOSIS — E78.2 MIXED HYPERLIPIDEMIA: ICD-10-CM

## 2025-08-08 DIAGNOSIS — R20.0 NUMBNESS OF LEFT FOOT: ICD-10-CM

## 2025-08-08 DIAGNOSIS — M79.672 LEFT FOOT PAIN: ICD-10-CM

## 2025-08-08 PROCEDURE — 99396 PREV VISIT EST AGE 40-64: CPT | Performed by: STUDENT IN AN ORGANIZED HEALTH CARE EDUCATION/TRAINING PROGRAM

## 2025-08-08 PROCEDURE — 99214 OFFICE O/P EST MOD 30 MIN: CPT | Performed by: STUDENT IN AN ORGANIZED HEALTH CARE EDUCATION/TRAINING PROGRAM

## 2025-08-08 PROCEDURE — 90677 PCV20 VACCINE IM: CPT | Performed by: STUDENT IN AN ORGANIZED HEALTH CARE EDUCATION/TRAINING PROGRAM

## 2025-08-08 PROCEDURE — 90471 IMMUNIZATION ADMIN: CPT | Performed by: STUDENT IN AN ORGANIZED HEALTH CARE EDUCATION/TRAINING PROGRAM

## 2025-08-08 PROCEDURE — 90715 TDAP VACCINE 7 YRS/> IM: CPT | Performed by: STUDENT IN AN ORGANIZED HEALTH CARE EDUCATION/TRAINING PROGRAM

## 2025-08-08 PROCEDURE — 3008F BODY MASS INDEX DOCD: CPT | Performed by: STUDENT IN AN ORGANIZED HEALTH CARE EDUCATION/TRAINING PROGRAM

## 2025-08-08 PROCEDURE — 90472 IMMUNIZATION ADMIN EACH ADD: CPT | Performed by: STUDENT IN AN ORGANIZED HEALTH CARE EDUCATION/TRAINING PROGRAM

## 2025-08-08 RX ORDER — ATORVASTATIN CALCIUM 10 MG/1
10 TABLET, FILM COATED ORAL DAILY
Qty: 90 TABLET | Refills: 3 | Status: SHIPPED | OUTPATIENT
Start: 2025-08-08

## 2025-08-08 ASSESSMENT — ANXIETY QUESTIONNAIRES
5. BEING SO RESTLESS THAT IT IS HARD TO SIT STILL: SEVERAL DAYS
GAD7 TOTAL SCORE: 7
7. FEELING AFRAID AS IF SOMETHING AWFUL MIGHT HAPPEN: SEVERAL DAYS
1. FEELING NERVOUS, ANXIOUS, OR ON EDGE: SEVERAL DAYS
4. TROUBLE RELAXING: SEVERAL DAYS
3. WORRYING TOO MUCH ABOUT DIFFERENT THINGS: SEVERAL DAYS
2. NOT BEING ABLE TO STOP OR CONTROL WORRYING: SEVERAL DAYS
6. BECOMING EASILY ANNOYED OR IRRITABLE: SEVERAL DAYS

## 2025-08-08 ASSESSMENT — PATIENT HEALTH QUESTIONNAIRE - PHQ9
7. TROUBLE CONCENTRATING ON THINGS, SUCH AS READING THE NEWSPAPER OR WATCHING TELEVISION: SEVERAL DAYS
9. THOUGHTS THAT YOU WOULD BE BETTER OFF DEAD, OR OF HURTING YOURSELF: NOT AT ALL
2. FEELING DOWN, DEPRESSED OR HOPELESS: SEVERAL DAYS
1. LITTLE INTEREST OR PLEASURE IN DOING THINGS: NOT AT ALL
8. MOVING OR SPEAKING SO SLOWLY THAT OTHER PEOPLE COULD HAVE NOTICED. OR THE OPPOSITE, BEING SO FIGETY OR RESTLESS THAT YOU HAVE BEEN MOVING AROUND A LOT MORE THAN USUAL: NOT AT ALL
5. POOR APPETITE OR OVEREATING: NOT AT ALL
6. FEELING BAD ABOUT YOURSELF - OR THAT YOU ARE A FAILURE OR HAVE LET YOURSELF OR YOUR FAMILY DOWN: SEVERAL DAYS
4. FEELING TIRED OR HAVING LITTLE ENERGY: SEVERAL DAYS
SUM OF ALL RESPONSES TO PHQ9 QUESTIONS 1 & 2: 1
SUM OF ALL RESPONSES TO PHQ QUESTIONS 1-9: 4
3. TROUBLE FALLING OR STAYING ASLEEP: NOT AT ALL

## 2025-10-28 ENCOUNTER — APPOINTMENT (OUTPATIENT)
Dept: HEMATOLOGY/ONCOLOGY | Facility: CLINIC | Age: 60
End: 2025-10-28
Payer: COMMERCIAL

## 2025-11-11 ENCOUNTER — APPOINTMENT (OUTPATIENT)
Dept: PRIMARY CARE | Facility: CLINIC | Age: 60
End: 2025-11-11
Payer: COMMERCIAL